# Patient Record
Sex: FEMALE | Race: WHITE | NOT HISPANIC OR LATINO | Employment: FULL TIME | URBAN - METROPOLITAN AREA
[De-identification: names, ages, dates, MRNs, and addresses within clinical notes are randomized per-mention and may not be internally consistent; named-entity substitution may affect disease eponyms.]

---

## 2017-01-10 ENCOUNTER — OFFICE VISIT (OUTPATIENT)
Dept: URGENT CARE | Facility: PHYSICIAN GROUP | Age: 52
End: 2017-01-10
Payer: COMMERCIAL

## 2017-01-10 VITALS
SYSTOLIC BLOOD PRESSURE: 142 MMHG | WEIGHT: 213.4 LBS | HEIGHT: 68 IN | HEART RATE: 96 BPM | DIASTOLIC BLOOD PRESSURE: 100 MMHG | TEMPERATURE: 99.7 F | RESPIRATION RATE: 12 BRPM | BODY MASS INDEX: 32.34 KG/M2 | OXYGEN SATURATION: 99 %

## 2017-01-10 DIAGNOSIS — R05.9 COUGH: ICD-10-CM

## 2017-01-10 DIAGNOSIS — B96.89 ACUTE BACTERIAL SINUSITIS: ICD-10-CM

## 2017-01-10 DIAGNOSIS — J01.90 ACUTE BACTERIAL SINUSITIS: ICD-10-CM

## 2017-01-10 PROCEDURE — 99214 OFFICE O/P EST MOD 30 MIN: CPT | Performed by: NURSE PRACTITIONER

## 2017-01-10 RX ORDER — LEVOFLOXACIN 500 MG/1
500 TABLET, FILM COATED ORAL DAILY
Qty: 10 TAB | Refills: 0 | Status: SHIPPED | OUTPATIENT
Start: 2017-01-10 | End: 2017-03-20

## 2017-01-10 ASSESSMENT — ENCOUNTER SYMPTOMS
SORE THROAT: 0
FEVER: 1
COUGH: 1
HEADACHES: 1
CHILLS: 1
RHINORRHEA: 1

## 2017-01-10 NOTE — MR AVS SNAPSHOT
"        Nhi Winters   1/10/2017 8:45 AM   Office Visit   MRN: [id number]    Department:  [facility]   Dept Phone:  [phone]    Description:  Female : 1965   Provider:  ALEX Stanton           Reason for Visit     Cough fever, mucous, since 16 - UC visit 16 - OTC not working      Allergies as of 1/10/2017     No Known Allergies      You were diagnosed with     Acute bacterial sinusitis   [331045]       Cough   [786.2.ICD-9-CM]         Vital Signs     Blood Pressure Pulse Temperature Respirations Height Weight    142/100 mmHg 96 37.6 °C (99.7 °F) 12 1.727 m (5' 8\") 96.798 kg (213 lb 6.4 oz)    Body Mass Index Oxygen Saturation Smoking Status             32.46 kg/m2 99% Former Smoker         Basic Information     Date Of Birth Sex Race Ethnicity Preferred Language    1965 Female White Non- English      Problem List              ICD-10-CM Priority Class Noted - Resolved    Foot pain M79.673   2011 - Present    History of depression Z86.59   2011 - Present    Bunion M21.619   2011 - Present    Metatarsalgia M77.40   2011 - Present    Hyperlipidemia E78.5   10/10/2011 - Present    Vitamin D deficiency E55.9   10/10/2011 - Present    History of reduction mammoplasty Z98.890   10/16/2012 - Present    Obesity (BMI 30.0-34.9) E66.9   2016 - Present      Health Maintenance        Date Due Completion Dates    COLONOSCOPY 2013 ---    PAP SMEAR 10/16/2015 10/16/2012    IMM INFLUENZA (1) 2016 10/26/2015, 11/15/2013    MAMMOGRAM 2016, 2013    IMM DTaP/Tdap/Td Vaccine (2 - Td) 10/1/2022 10/1/2012            Current Immunizations     Influenza TIV (IM) 11/15/2013    Influenza Vaccine Quad Inj (Preserved) 10/26/2015    Tdap Vaccine 10/1/2012    Tuberculin Skin Test 2011      Below and/or attached are the medications your provider expects you to take. Review all of your home medications and newly ordered medications " with your provider and/or pharmacist. Follow medication instructions as directed by your provider and/or pharmacist. Please keep your medication list with you and share with your provider. Update the information when medications are discontinued, doses are changed, or new medications (including over-the-counter products) are added; and carry medication information at all times in the event of emergency situations     Allergies:  No Known Allergies          Medications  Valid as of: January 10, 2017 -  9:40 AM    Generic Name Brand Name Tablet Size Instructions for use    Acetaminophen (Tab) TYLENOL 325 MG Take 650 mg by mouth every four hours as needed.        Albuterol Sulfate (Aero Soln) albuterol 108 (90 BASE) MCG/ACT Inhale 2 Puffs by mouth every four hours as needed for Shortness of Breath.        Albuterol Sulfate (Aero Soln) albuterol 108 (90 BASE) MCG/ACT Inhale 2 Puffs by mouth every four hours as needed for Shortness of Breath.        Ibuprofen   Take  by mouth.        LevoFLOXacin (Tab) LEVAQUIN 500 MG Take 1 Tab by mouth every day.        .                 Medicines prescribed today were sent to:     [facility] [address] [place] [address] [place] [address] [facility]    [facility] [address] [place] [address]    Phone: [phone] Fax: [phone]    Open 24 Hours?: No      Medication refill instructions:       If your prescription bottle indicates you have medication refills left, it is not necessary to call your provider’s office. Please contact your pharmacy and they will refill your medication.    If your prescription bottle indicates you do not have any refills left, you may request refills at any time through one of the following ways: The online [employer]ed [employer]ards system (except Urgent Care), by calling your provider’s office, or by asking your pharmacy to contact your provider’s office with a refill request. Medication refills are processed only during regular business hours and may not be  available until the next business day. Your provider may request additional information or to have a follow-up visit with you prior to refilling your medication.   *Please Note: Medication refills are assigned a new Rx number when refilled electronically. Your pharmacy may indicate that no refills were authorized even though a new prescription for the same medication is available at the pharmacy. Please request the medicine by name with the pharmacy before contacting your provider for a refill.           [employer] Access Code: Activation code not generated  Current Nemedia Status: Active

## 2017-01-10 NOTE — PROGRESS NOTES
"Subjective:      Nhi Winters is a 51 y.o. female who presents with Cough            Cough  This is a new problem. The current episode started 1 to 4 weeks ago. The problem has been unchanged. The problem occurs constantly. The cough is productive of sputum. Associated symptoms include chills, a fever, headaches, nasal congestion, postnasal drip and rhinorrhea. Pertinent negatives include no sore throat.   she has been sick since December, got somewhat better and then worsened again this past week. Fevers up to 101. 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  Allergies, medications and history reviewed by me today      Review of Systems   Constitutional: Positive for fever and chills.   HENT: Positive for postnasal drip and rhinorrhea. Negative for sore throat.    Respiratory: Positive for cough.    Neurological: Positive for headaches.          Objective:     /100 mmHg  Pulse 96  Temp(Src) 37.6 °C (99.7 °F)  Resp 12  Ht 1.727 m (5' 8\")  Wt 96.798 kg (213 lb 6.4 oz)  BMI 32.46 kg/m2  SpO2 99%     Physical Exam   Constitutional: She is oriented to person, place, and time. She appears well-developed and well-nourished. No distress.   HENT:   Head: Normocephalic and atraumatic.   Right Ear: External ear and ear canal normal. Tympanic membrane is not injected and not perforated. No middle ear effusion.   Left Ear: External ear and ear canal normal. Tympanic membrane is not injected and not perforated.  No middle ear effusion.   Nose: Mucosal edema present.   Mouth/Throat: Posterior oropharyngeal erythema present. No oropharyngeal exudate.   Eyes: Conjunctivae are normal. Right eye exhibits no discharge. Left eye exhibits no discharge.   Neck: Normal range of motion. Neck supple.   Cardiovascular: Normal rate, regular rhythm and normal heart sounds.    No murmur heard.  Pulmonary/Chest: Effort normal and breath sounds normal. No respiratory distress.   Musculoskeletal: Normal range of motion.   Normal movement of all " 4 extremities.   Lymphadenopathy:     She has no cervical adenopathy.        Right: No supraclavicular adenopathy present.        Left: No supraclavicular adenopathy present.   Neurological: She is alert and oriented to person, place, and time. Gait normal.   Skin: Skin is warm and dry.   Psychiatric: She has a normal mood and affect. Her behavior is normal. Thought content normal.   Nursing note and vitals reviewed.              Assessment/Plan:     1. Acute bacterial sinusitis  levofloxacin (LEVAQUIN) 500 MG tablet   2. Cough       Viral component possible with new onset of symptoms again after some improvement.  levaquin  May continue OTC medications as directed.  Differential diagnosis, natural history, supportive care, and indications for immediate follow-up discussed at length.

## 2017-03-20 ENCOUNTER — OFFICE VISIT (OUTPATIENT)
Dept: MEDICAL GROUP | Facility: PHYSICIAN GROUP | Age: 52
End: 2017-03-20
Payer: COMMERCIAL

## 2017-03-20 VITALS
WEIGHT: 200 LBS | BODY MASS INDEX: 31.39 KG/M2 | OXYGEN SATURATION: 96 % | SYSTOLIC BLOOD PRESSURE: 142 MMHG | DIASTOLIC BLOOD PRESSURE: 88 MMHG | TEMPERATURE: 99.3 F | HEIGHT: 67 IN | HEART RATE: 90 BPM

## 2017-03-20 DIAGNOSIS — R79.89 ELEVATED TSH: ICD-10-CM

## 2017-03-20 DIAGNOSIS — Z12.31 ENCOUNTER FOR SCREENING MAMMOGRAM FOR MALIGNANT NEOPLASM OF BREAST: ICD-10-CM

## 2017-03-20 DIAGNOSIS — E66.9 OBESITY (BMI 30-39.9): ICD-10-CM

## 2017-03-20 DIAGNOSIS — Z13.6 SCREENING FOR CARDIOVASCULAR CONDITION: ICD-10-CM

## 2017-03-20 DIAGNOSIS — R23.2 HOT FLASHES: ICD-10-CM

## 2017-03-20 DIAGNOSIS — R07.0 THROAT DISCOMFORT: ICD-10-CM

## 2017-03-20 PROBLEM — M54.2 CERVICAL PAIN: Status: ACTIVE | Noted: 2017-03-20

## 2017-03-20 PROCEDURE — 99214 OFFICE O/P EST MOD 30 MIN: CPT | Performed by: FAMILY MEDICINE

## 2017-03-20 NOTE — MR AVS SNAPSHOT
"        Nhi Winters   3/20/2017 4:20 PM   Office Visit   MRN: [id number]    Department:  [facility]   Dept Phone:  [phone]    Description:  Female : 1965   Provider:  Laquita Harrington D.O.           Reason for Visit     Establish Care wants to talk about menopause       Allergies as of 3/20/2017     No Known Allergies      You were diagnosed with     Hot flashes   [060947]       Throat discomfort   [623878]       Elevated TSH   [328381]       Obesity (BMI 30-39.9)   [491172]       Screening for cardiovascular condition   [819106]       Encounter for screening mammogram for malignant neoplasm of breast   [061246]         Vital Signs     Blood Pressure Pulse Temperature Height Weight Body Mass Index    142/88 mmHg 90 37.4 °C (99.3 °F) 1.702 m (5' 7\") 90.719 kg (200 lb) 31.32 kg/m2    Oxygen Saturation Smoking Status                96% Former Smoker          Basic Information     Date Of Birth Sex Race Ethnicity Preferred Language    1965 Female White Non- English      Problem List              ICD-10-CM Priority Class Noted - Resolved    Bunion M21.619   2011 - Present    Metatarsalgia M77.40   2011 - Present    Hyperlipidemia E78.5   10/10/2011 - Present    Vitamin D deficiency E55.9   10/10/2011 - Present    Obesity (BMI 30.0-34.9) E66.9   2016 - Present    Cervical pain M54.2   3/20/2017 - Present    Elevated TSH R94.6   3/20/2017 - Present      Health Maintenance        Date Due Completion Dates    PAP SMEAR 10/16/2015 10/16/2012    IMM INFLUENZA (1) 2016 10/26/2015, 11/15/2013    MAMMOGRAM 2016, 2013    COLONOSCOPY 2017 (Originally 2013) ---    IMM DTaP/Tdap/Td Vaccine (2 - Td) 10/1/2022 10/1/2012            Current Immunizations     Influenza TIV (IM) 11/15/2013    Influenza Vaccine Quad Inj (Preserved) 10/26/2015    Tdap Vaccine 10/1/2012    Tuberculin Skin Test 2011      Below and/or attached are the medications your " provider expects you to take. Review all of your home medications and newly ordered medications with your provider and/or pharmacist. Follow medication instructions as directed by your provider and/or pharmacist. Please keep your medication list with you and share with your provider. Update the information when medications are discontinued, doses are changed, or new medications (including over-the-counter products) are added; and carry medication information at all times in the event of emergency situations     Allergies:  No Known Allergies          Medications  Valid as of: March 20, 2017 -  5:56 PM    Generic Name Brand Name Tablet Size Instructions for use    .                 Medicines prescribed today were sent to:     [id number]  [place] - [address] AT [facility]    [address] [place] [address]    Phone: [phone] Fax: [phone]    Open 24 Hours?: No      Medication refill instructions:       If your prescription bottle indicates you have medication refills left, it is not necessary to call your provider’s office. Please contact your pharmacy and they will refill your medication.    If your prescription bottle indicates you do not have any refills left, you may request refills at any time through one of the following ways: The online Instilling Values system (except Urgent Care), by calling your provider’s office, or by asking your pharmacy to contact your provider’s office with a refill request. Medication refills are processed only during regular business hours and may not be available until the next business day. Your provider may request additional information or to have a follow-up visit with you prior to refilling your medication.   *Please Note: Medication refills are assigned a new Rx number when refilled electronically. Your pharmacy may indicate that no refills were authorized even though a new prescription for the same medication is available at the  pharmacy. Please request the medicine by name with the pharmacy before contacting your provider for a refill.        Your To Do List     Future Labs/Procedures Complete By Expires    CBC WITH DIFFERENTIAL  As directed 3/20/2018    COMP METABOLIC PANEL  As directed 3/20/2018    LIPID PROFILE  As directed 3/20/2018    MAGNESIUM  As directed 3/20/2018    TSH WITH REFLEX TO FT4  As directed 3/20/2018    US-SOFT TISSUES OF HEAD - NECK  As directed 3/20/2018    VITAMIN D,25 HYDROXY  As directed 3/20/2018         [employer] Access Code: Activation code not generated  Current [email or web address] Status: Active

## 2017-03-20 NOTE — PROGRESS NOTES
Subjective:     Chief Complaint   Patient presents with   • Establish Care     wants to talk about menopause        Nhi Winters is a 51 y.o. female here today for concern over hot flashes and menopause. Pt reports 2 to 3 months of hot flashes which is improved with Mg. Patient reports  LMP 3 months prior. Patient is adopted and does not know first-degree relatives. Patient reports mild irritability. She has tried evening. His which caused worsening mood. Patient denies any abdominal pain, weight loss, possible self harm, increased vaginal bleeding, or abdominal cramping    Patient has a history of slightly elevated TSH. Patient is currently not taking any thyroid supplementation. Patient reports a discomfort in her throat occasionally which she describes as a pressure when she is lying down. Patient denies any difficulty swallowing, painful swallowing, or change in voice.      No Known Allergies  Current medicines (including changes today)  No current outpatient prescriptions on file.     No current facility-administered medications for this visit.     Social History   Substance Use Topics   • Smoking status: Former Smoker     Quit date: 10/12/2004   • Smokeless tobacco: Never Used      Comment: avoid any and all tobacco products   • Alcohol Use: 0.0 oz/week     0 Standard drinks or equivalent per week      Comment: occ     Family Status   Relation Status Death Age   • Mother     • Father       No family history on file.  She    has a past medical history of Pain; Foot pain (9/23/2011); Family history of alcoholism (9/23/2011); and History of depression (9/23/2011).        ROS   GEN: no weight loss, fevers, or chills  HEENT: no blurry vision or change in vision  Resp: no shortness of breath, no cough  CV: no racing heart, no irregular beats, no chest pain  Abd: no nausea, no vomiting, no diarrhea, no constipation, no blood in stool, no dark stools, no incontinence  MSK: no muscle aches, no joint pain, no limited  "motion  Neuro: no headaches, no dizziness, no LOC, no weakness, no numbness/tingling       Objective:     Blood pressure 142/88, pulse 90, temperature 37.4 °C (99.3 °F), height 1.702 m (5' 7\"), weight 90.719 kg (200 lb), SpO2 96 %. Body mass index is 31.32 kg/(m^2). Physical Exam:  Constitutional: Alert, no distress.  Skin: Warm, dry, good turgor, no rashes in visible areas.  Eye: Equal, round and reactive, conjunctiva clear, lids normal.  ENMT: Lips without lesions, good dentition, oropharynx non-erythematous, no exudate, moist oral mucosa  Neck: Trachea midline, no masses, enlarged right thyroid lobe, no nodules noted no cervical or supraclavicular lymphadenopathy. Full ROM  Respiratory: Unlabored respiratory effort, good air movement, lungs clear to auscultation, no wheezes, no ronchi.  Cardiovascular:RRR, +S1, S2, no murmur, no peripheral edema, pedal and radial pulses equal and intact bilat  Abdomen: Soft, non-tender, no masses, no hepatosplenomegaly.  MSK:5/5 muscle strength in upper extremities as well as lower extremity bilaterally  Psych: Alert and oriented x3, appropriate affect and mood.  Neuro: CN2-12 intact, no gross motor or sensory deficits      Assessment and Plan:   The following treatment plan was discussed    1. Hot flashes  Most consistent with menopause. We'll obtain labs to rule out other causes. Discussion with patient about course of menopause and treatment options. Patient will try black cohosh. Advised patient that increasing vaginal bleeding is not a normal finding of menopause. If patient has increasing vaginal bleeding, painful intercourse, or transient discomfort she is to come to our office immediately. Advised patient of need for Pap.   -MAGNESIUM; Future  - COMP METABOLIC PANEL; Future  - VITAMIN D,25 HYDROXY; Future  - CBC WITH DIFFERENTIAL; Future    2. Throat discomfort  New problem: Right thyroid lobe feels enlarged, therefore ultrasound ordered  - US-SOFT TISSUES OF HEAD - NECK; " Future    3. Elevated TSH  Seen on labs in September. T4 was within normal limit. We'll be checking and treat as needed  - TSH WITH REFLEX TO FT4; Future  - COMP METABOLIC PANEL; Future  - CBC WITH DIFFERENTIAL; Future    4. Obesity (BMI 30-39.9)  Pt educated on the increase of morbidity and mortality associated with excess weight including DM, Heart Disease, HTN, stroke, and sleep apnea.  Pt advised weight loss of 5% through reduced calorie, low fat diet and 150 mins of exercise a week  - Patient identified as having weight management issue.  Appropriate orders and counseling given.  - LIPID PROFILE; Future    5. Screening for cardiovascular condition  - LIPID PROFILE; Future    6. Encounter for screening mammogram for malignant neoplasm of breast  - MA-SCREEN MAMMO W/CAD-BILAT      Followup: Return in about 4 weeks (around 4/17/2017) for PAP.    Please note that this dictation was created using voice recognition software. I have made every reasonable attempt to correct obvious errors, but I expect that there are errors of grammar and possibly content that I did not discover before finalizing the note.

## 2017-03-28 ENCOUNTER — HOSPITAL ENCOUNTER (OUTPATIENT)
Dept: RADIOLOGY | Facility: MEDICAL CENTER | Age: 52
End: 2017-03-28
Attending: FAMILY MEDICINE
Payer: COMMERCIAL

## 2017-03-28 DIAGNOSIS — R07.0 THROAT DISCOMFORT: ICD-10-CM

## 2017-03-28 PROCEDURE — 76536 US EXAM OF HEAD AND NECK: CPT

## 2017-03-30 ENCOUNTER — HOSPITAL ENCOUNTER (OUTPATIENT)
Dept: LAB | Facility: MEDICAL CENTER | Age: 52
End: 2017-03-30
Attending: FAMILY MEDICINE
Payer: COMMERCIAL

## 2017-03-30 ENCOUNTER — OFFICE VISIT (OUTPATIENT)
Dept: MEDICAL GROUP | Facility: PHYSICIAN GROUP | Age: 52
End: 2017-03-30
Payer: COMMERCIAL

## 2017-03-30 VITALS
RESPIRATION RATE: 16 BRPM | SYSTOLIC BLOOD PRESSURE: 130 MMHG | HEART RATE: 93 BPM | WEIGHT: 200 LBS | OXYGEN SATURATION: 97 % | BODY MASS INDEX: 31.39 KG/M2 | HEIGHT: 67 IN | TEMPERATURE: 98.6 F | DIASTOLIC BLOOD PRESSURE: 80 MMHG

## 2017-03-30 DIAGNOSIS — E66.9 OBESITY (BMI 30-39.9): ICD-10-CM

## 2017-03-30 DIAGNOSIS — R23.2 HOT FLASHES: ICD-10-CM

## 2017-03-30 DIAGNOSIS — R79.89 ELEVATED TSH: ICD-10-CM

## 2017-03-30 DIAGNOSIS — E04.1 THYROID NODULE: ICD-10-CM

## 2017-03-30 DIAGNOSIS — Z13.6 SCREENING FOR CARDIOVASCULAR CONDITION: ICD-10-CM

## 2017-03-30 LAB
25(OH)D3 SERPL-MCNC: 30 NG/ML (ref 30–100)
ALBUMIN SERPL BCP-MCNC: 4.3 G/DL (ref 3.2–4.9)
ALBUMIN/GLOB SERPL: 1.2 G/DL
ALP SERPL-CCNC: 75 U/L (ref 30–99)
ALT SERPL-CCNC: 20 U/L (ref 2–50)
ANION GAP SERPL CALC-SCNC: 10 MMOL/L (ref 0–11.9)
AST SERPL-CCNC: 18 U/L (ref 12–45)
BASOPHILS # BLD AUTO: 0.13 K/UL (ref 0–0.12)
BASOPHILS NFR BLD AUTO: 0.5 % (ref 0–1.8)
BILIRUB SERPL-MCNC: 0.8 MG/DL (ref 0.1–1.5)
BUN SERPL-MCNC: 21 MG/DL (ref 8–22)
CALCIUM SERPL-MCNC: 10.1 MG/DL (ref 8.5–10.5)
CHLORIDE SERPL-SCNC: 104 MMOL/L (ref 96–112)
CHOLEST SERPL-MCNC: 264 MG/DL (ref 100–199)
CO2 SERPL-SCNC: 25 MMOL/L (ref 20–33)
CREAT SERPL-MCNC: 0.91 MG/DL (ref 0.5–1.4)
EOSINOPHIL # BLD: 0.2 K/UL (ref 0–0.51)
EOSINOPHIL NFR BLD AUTO: 0.8 % (ref 0–6.9)
ERYTHROCYTE [DISTWIDTH] IN BLOOD BY AUTOMATED COUNT: 44.8 FL (ref 35.9–50)
GLOBULIN SER CALC-MCNC: 3.5 G/DL (ref 1.9–3.5)
GLUCOSE SERPL-MCNC: 108 MG/DL (ref 65–99)
HCT VFR BLD AUTO: 42.6 % (ref 37–47)
HDLC SERPL-MCNC: 77 MG/DL
HGB BLD-MCNC: 13.7 G/DL (ref 12–16)
IMM GRANULOCYTES # BLD AUTO: 0.13 K/UL (ref 0–0.11)
IMM GRANULOCYTES NFR BLD AUTO: 0.5 % (ref 0–0.9)
LDLC SERPL CALC-MCNC: 167 MG/DL
LYMPHOCYTES # BLD: 2.47 K/UL (ref 1–4.8)
LYMPHOCYTES NFR BLD AUTO: 9.5 % (ref 22–41)
MAGNESIUM SERPL-MCNC: 2.3 MG/DL (ref 1.5–2.5)
MCH RBC QN AUTO: 30.9 PG (ref 27–33)
MCHC RBC AUTO-ENTMCNC: 32.2 G/DL (ref 33.6–35)
MCV RBC AUTO: 95.9 FL (ref 81.4–97.8)
MONOCYTES # BLD: 1.06 K/UL (ref 0–0.85)
MONOCYTES NFR BLD AUTO: 4.1 % (ref 0–13.4)
NEUTROPHILS # BLD: 21.97 K/UL (ref 2–7.15)
NEUTROPHILS NFR BLD AUTO: 84.6 % (ref 44–72)
NRBC # BLD AUTO: 0 K/UL
NRBC BLD-RTO: 0 /100 WBC
PLATELET # BLD AUTO: 413 K/UL (ref 164–446)
PMV BLD AUTO: 10.5 FL (ref 9–12.9)
POTASSIUM SERPL-SCNC: 4.3 MMOL/L (ref 3.6–5.5)
PROT SERPL-MCNC: 7.8 G/DL (ref 6–8.2)
RBC # BLD AUTO: 4.44 M/UL (ref 4.2–5.4)
SODIUM SERPL-SCNC: 139 MMOL/L (ref 135–145)
TRIGL SERPL-MCNC: 100 MG/DL (ref 0–149)
TSH SERPL DL<=0.005 MIU/L-ACNC: 2.25 UIU/ML (ref 0.3–3.7)
WBC # BLD AUTO: 26 K/UL (ref 4.8–10.8)

## 2017-03-30 PROCEDURE — 36415 COLL VENOUS BLD VENIPUNCTURE: CPT

## 2017-03-30 PROCEDURE — 99213 OFFICE O/P EST LOW 20 MIN: CPT | Performed by: FAMILY MEDICINE

## 2017-03-30 PROCEDURE — 80061 LIPID PANEL: CPT

## 2017-03-30 PROCEDURE — 84443 ASSAY THYROID STIM HORMONE: CPT

## 2017-03-30 PROCEDURE — 85025 COMPLETE CBC W/AUTO DIFF WBC: CPT

## 2017-03-30 PROCEDURE — 82306 VITAMIN D 25 HYDROXY: CPT

## 2017-03-30 PROCEDURE — 83735 ASSAY OF MAGNESIUM: CPT

## 2017-03-30 PROCEDURE — 80053 COMPREHEN METABOLIC PANEL: CPT

## 2017-03-30 RX ORDER — FLUTICASONE PROPIONATE 50 MCG
1 SPRAY, SUSPENSION (ML) NASAL DAILY
COMMUNITY
End: 2019-03-12

## 2017-03-30 RX ORDER — IBUPROFEN 400 MG/1
400 TABLET ORAL EVERY 6 HOURS PRN
COMMUNITY

## 2017-03-30 ASSESSMENT — PATIENT HEALTH QUESTIONNAIRE - PHQ9: CLINICAL INTERPRETATION OF PHQ2 SCORE: 0

## 2017-03-30 NOTE — PROGRESS NOTES
Subjective:     Chief Complaint   Patient presents with   • Follow-Up       Nhi Winters is a 51 y.o. female here today for follow-up on thyroid nodule. Patient recently had ultrasound of thyroid which showed nodule. Patient reports history of exposure to multiple x-rays when she worked as a . She does report occasional pressure in her throat particularly when she is lying flat. Patient had labs done this morning. Once labs have been reviewed we will decide between nuclear medicine skin test versus FNA. Both procedures were discussed with the patient.     No Known Allergies  Current medicines (including changes today)  Current Outpatient Prescriptions   Medication Sig Dispense Refill   • ibuprofen (MOTRIN) 400 MG Tab Take 400 mg by mouth every 6 hours as needed.     • fluticasone (FLONASE) 50 MCG/ACT nasal spray Spray 1 Spray in nose every day.       No current facility-administered medications for this visit.     Social History   Substance Use Topics   • Smoking status: Former Smoker     Quit date: 10/12/2004   • Smokeless tobacco: Never Used      Comment: avoid any and all tobacco products   • Alcohol Use: 0.0 oz/week     0 Standard drinks or equivalent per week      Comment: occ     Family Status   Relation Status Death Age   • Mother     • Father       History reviewed. No pertinent family history.  She    has a past medical history of Pain; Foot pain (9/23/2011); Family history of alcoholism (9/23/2011); and History of depression (9/23/2011).        ROS   GEN: no weight loss, fevers, or chills, + hot flashes  HEENT: no blurry vision or change in vision  Resp: no shortness of breath, no cough  CV: no racing heart, no irregular beats, no chest pain  Abd: no nausea, no vomiting, no diarrhea, no constipation, no blood in stool, no dark stools, no incontinence  MSK: no muscle aches, no joint pain, no limited motion  Neuro: no headaches, no dizziness, no LOC, no weakness, no numbness/tingling        "Objective:     Blood pressure 130/80, pulse 93, temperature 37 °C (98.6 °F), resp. rate 16, height 1.702 m (5' 7\"), weight 90.719 kg (200 lb), SpO2 97 %. Body mass index is 31.32 kg/(m^2).   Physical Exam:  Constitutional: Alert, no distress.  Skin: Warm, dry, good turgor, no rashes in visible areas.  Eye: Equal, round and reactive, conjunctiva clear, lids normal.  ENMT: Lips without lesions, good dentition, oropharynx non-erythematous, no exudate, moist oral mucosa  Neck: Trachea midline, no masses, enlarged right thyroid lobe, no cervical or supraclavicular lymphadenopathy. Full ROM  Respiratory: Unlabored respiratory effort, good air movement, lungs clear to auscultation, no wheezes, no ronchi.  Cardiovascular:RRR, +S1, S2, no murmur, no peripheral edema, pedal and radial pulses equal and intact bilat  Abdomen: Soft, non-tender, no masses, no hepatosplenomegaly.  MSK:5/5 muscle strength in upper extremities as well as lower extremity bilaterally  Psych: Alert and oriented x3, appropriate affect and mood.  Neuro: CN2-12 intact, no gross motor or sensory deficits      Assessment and Plan:   The following treatment plan was discussed    1. Thyroid nodule  As per history of present illness biopsy versus thyroid nuclear medicine scan discussed with patient. Patient has had labs done today no results yet available. Once labs have been obtained we'll decide next step in management.      Followup: Return if symptoms worsen or fail to improve.    Please note that this dictation was created using voice recognition software. I have made every reasonable attempt to correct obvious errors, but I expect that there are errors of grammar and possibly content that I did not discover before finalizing the note.           "

## 2017-03-30 NOTE — MR AVS SNAPSHOT
"        Nhi Winters   3/30/2017 10:20 AM   Office Visit   MRN: [id number]    Department:  [facility]   Dept Phone:  [phone]    Description:  Female : 1965   Provider:  Laquita Harrington D.O.           Reason for Visit     Follow-Up           Allergies as of 3/30/2017     No Known Allergies      You were diagnosed with     Thyroid nodule   [[id number]]         Vital Signs     Blood Pressure Pulse Temperature Respirations Height Weight    130/80 mmHg 93 37 °C (98.6 °F) 16 1.702 m (5' 7\") 90.719 kg (200 lb)    Body Mass Index Oxygen Saturation Smoking Status             31.32 kg/m2 97% Former Smoker         Basic Information     Date Of Birth Sex Race Ethnicity Preferred Language    1965 Female White Non- English      Your appointments     2017  8:00 AM   MA [id number] with [facility] MG 3   [facility] BREAST [facility] ([address])    [address] [place] [address]  [facility] [id number] [phone]           No deodorant, powder, perfume or lotion under the arm or breast area.              Problem List              ICD-10-CM Priority Class Noted - Resolved    Bunion M21.619   2011 - Present    Metatarsalgia M77.40   2011 - Present    Hyperlipidemia E78.5   10/10/2011 - Present    Vitamin D deficiency E55.9   10/10/2011 - Present    Obesity (BMI 30.0-34.9) E66.9   2016 - Present    Cervical pain M54.2   3/20/2017 - Present    Elevated TSH R94.6   3/20/2017 - Present    Thyroid nodule E04.1   3/30/2017 - Present      Health Maintenance        Date Due Completion Dates    PAP SMEAR 10/16/2015 10/16/2012    IMM INFLUENZA (1) 2016 10/26/2015, 11/15/2013    MAMMOGRAM 2016, 2013    COLONOSCOPY 2017 (Originally 2013) ---    IMM DTaP/Tdap/Td Vaccine (2 - Td) 10/1/2022 10/1/2012            Current Immunizations     Influenza TIV (IM) 11/15/2013    Influenza Vaccine Quad Inj (Preserved) 10/26/2015    Tdap Vaccine 10/1/2012    Tuberculin Skin " Test 9/16/2011      Below and/or attached are the medications your provider expects you to take. Review all of your home medications and newly ordered medications with your provider and/or pharmacist. Follow medication instructions as directed by your provider and/or pharmacist. Please keep your medication list with you and share with your provider. Update the information when medications are discontinued, doses are changed, or new medications (including over-the-counter products) are added; and carry medication information at all times in the event of emergency situations     Allergies:  No Known Allergies          Medications  Valid as of: March 30, 2017 - 11:36 AM    Generic Name Brand Name Tablet Size Instructions for use    Fluticasone Propionate (Suspension) FLONASE 50 MCG/ACT Spray 1 Spray in nose every day.        Ibuprofen (Tab) MOTRIN 400 MG Take 400 mg by mouth every 6 hours as needed.        .                 Medicines prescribed today were sent to:     [facility] [address] [place] [address], [place] - [address] AT [facility] [address] [place]    [address] [place] [address]    Phone: [phone] Fax: [phone]    Open 24 Hours?: No      Medication refill instructions:       If your prescription bottle indicates you have medication refills left, it is not necessary to call your provider’s office. Please contact your pharmacy and they will refill your medication.    If your prescription bottle indicates you do not have any refills left, you may request refills at any time through one of the following ways: The online [employer] system (except Urgent Care), by calling your provider’s office, or by asking your pharmacy to contact your provider’s office with a refill request. Medication refills are processed only during regular business hours and may not be available until the next business day. Your provider may request additional information or to have a follow-up visit with you prior to refilling your  medication.   *Please Note: Medication refills are assigned a new Rx number when refilled electronically. Your pharmacy may indicate that no refills were authorized even though a new prescription for the same medication is available at the pharmacy. Please request the medicine by name with the pharmacy before contacting your provider for a refill.           [employer] Access Code: Activation code not generated  Current [place] Status: Active

## 2017-04-05 DIAGNOSIS — D72.825 BANDEMIA: ICD-10-CM

## 2017-04-05 DIAGNOSIS — J01.10 ACUTE NON-RECURRENT FRONTAL SINUSITIS: ICD-10-CM

## 2017-04-05 RX ORDER — AMOXICILLIN AND CLAVULANATE POTASSIUM 875; 125 MG/1; MG/1
1 TABLET, FILM COATED ORAL 2 TIMES DAILY
Qty: 14 TAB | Refills: 0 | Status: SHIPPED | OUTPATIENT
Start: 2017-04-05 | End: 2017-04-12

## 2017-04-13 ENCOUNTER — TELEPHONE (OUTPATIENT)
Dept: MEDICAL GROUP | Facility: PHYSICIAN GROUP | Age: 52
End: 2017-04-13

## 2017-04-13 DIAGNOSIS — E04.1 THYROID NODULE: ICD-10-CM

## 2017-04-13 DIAGNOSIS — R79.89 ELEVATED TSH: ICD-10-CM

## 2017-04-13 NOTE — TELEPHONE ENCOUNTER
VOICEMAIL  1. Caller Name: Nhi Winters                      Call Back Number: [phone] (home)     2. Message: pt is inquiring as to when her thyroid bx is going to be ordered so she can schedule the appointment. States she was told it was to be ordered on 4/5/17 but there is none. Please advise.    3. Patient approves office to leave a detailed voicemail/MyChart message: yes

## 2017-04-19 ENCOUNTER — HOSPITAL ENCOUNTER (OUTPATIENT)
Dept: LAB | Facility: MEDICAL CENTER | Age: 52
End: 2017-04-19
Attending: FAMILY MEDICINE
Payer: COMMERCIAL

## 2017-04-19 DIAGNOSIS — D72.825 BANDEMIA: ICD-10-CM

## 2017-04-19 LAB
BASOPHILS # BLD AUTO: 0.6 % (ref 0–1.8)
BASOPHILS # BLD: 0.11 K/UL (ref 0–0.12)
EOSINOPHIL # BLD AUTO: 0.41 K/UL (ref 0–0.51)
EOSINOPHIL NFR BLD: 2.4 % (ref 0–6.9)
ERYTHROCYTE [DISTWIDTH] IN BLOOD BY AUTOMATED COUNT: 42.6 FL (ref 35.9–50)
HCT VFR BLD AUTO: 44.9 % (ref 37–47)
HGB BLD-MCNC: 14.2 G/DL (ref 12–16)
IMM GRANULOCYTES # BLD AUTO: 0.08 K/UL (ref 0–0.11)
IMM GRANULOCYTES NFR BLD AUTO: 0.5 % (ref 0–0.9)
LYMPHOCYTES # BLD AUTO: 3.95 K/UL (ref 1–4.8)
LYMPHOCYTES NFR BLD: 23.3 % (ref 22–41)
MCH RBC QN AUTO: 30.2 PG (ref 27–33)
MCHC RBC AUTO-ENTMCNC: 31.6 G/DL (ref 33.6–35)
MCV RBC AUTO: 95.5 FL (ref 81.4–97.8)
MONOCYTES # BLD AUTO: 1.12 K/UL (ref 0–0.85)
MONOCYTES NFR BLD AUTO: 6.6 % (ref 0–13.4)
NEUTROPHILS # BLD AUTO: 11.27 K/UL (ref 2–7.15)
NEUTROPHILS NFR BLD: 66.6 % (ref 44–72)
NRBC # BLD AUTO: 0 K/UL
NRBC BLD AUTO-RTO: 0 /100 WBC
PLATELET # BLD AUTO: 432 K/UL (ref 164–446)
PMV BLD AUTO: 10.5 FL (ref 9–12.9)
RBC # BLD AUTO: 4.7 M/UL (ref 4.2–5.4)
WBC # BLD AUTO: 16.9 K/UL (ref 4.8–10.8)

## 2017-04-19 PROCEDURE — 85025 COMPLETE CBC W/AUTO DIFF WBC: CPT

## 2017-04-19 PROCEDURE — 36415 COLL VENOUS BLD VENIPUNCTURE: CPT

## 2017-05-04 ENCOUNTER — HOSPITAL ENCOUNTER (OUTPATIENT)
Dept: RADIOLOGY | Facility: MEDICAL CENTER | Age: 52
End: 2017-05-04
Attending: FAMILY MEDICINE
Payer: COMMERCIAL

## 2017-05-04 DIAGNOSIS — E04.1 THYROID NODULE: ICD-10-CM

## 2017-05-04 DIAGNOSIS — R79.89 ELEVATED TSH: ICD-10-CM

## 2017-05-04 PROCEDURE — 10022 HCHG FINE NEEDLE ASP W/IMAGING GUIDANCE: CPT

## 2017-05-04 PROCEDURE — 76942 ECHO GUIDE FOR BIOPSY: CPT

## 2017-05-04 PROCEDURE — 88112 CYTOPATH CELL ENHANCE TECH: CPT

## 2017-05-04 NOTE — PROGRESS NOTES
US guided right thyroid nodule fine needle aspiration done by Dr. Strickland; right anterior aspect of neck access site; 1 jar of cytolyt obtained and sent to pathology lab; pt tolerated the procedure well; pt hemodynamically stable pre/intra/post procedure; all questions and concerns answered prior to being d/c; patient provided with appropriate education for procedure; pt d/c home.

## 2017-05-17 ENCOUNTER — HOSPITAL ENCOUNTER (OUTPATIENT)
Dept: RADIOLOGY | Facility: MEDICAL CENTER | Age: 52
End: 2017-05-17
Attending: FAMILY MEDICINE
Payer: COMMERCIAL

## 2017-05-17 PROCEDURE — 77063 BREAST TOMOSYNTHESIS BI: CPT

## 2017-12-04 ENCOUNTER — OFFICE VISIT (OUTPATIENT)
Dept: MEDICAL GROUP | Facility: PHYSICIAN GROUP | Age: 52
End: 2017-12-04
Payer: COMMERCIAL

## 2017-12-04 VITALS
HEIGHT: 67 IN | TEMPERATURE: 98.8 F | OXYGEN SATURATION: 96 % | BODY MASS INDEX: 32.65 KG/M2 | RESPIRATION RATE: 16 BRPM | DIASTOLIC BLOOD PRESSURE: 72 MMHG | WEIGHT: 208 LBS | SYSTOLIC BLOOD PRESSURE: 120 MMHG | HEART RATE: 103 BPM

## 2017-12-04 DIAGNOSIS — Z12.11 SCREENING FOR COLON CANCER: ICD-10-CM

## 2017-12-04 DIAGNOSIS — M25.512 LEFT ANTERIOR SHOULDER PAIN: ICD-10-CM

## 2017-12-04 DIAGNOSIS — R53.82 CHRONIC FATIGUE: ICD-10-CM

## 2017-12-04 PROBLEM — J32.4 CHRONIC PANSINUSITIS: Status: ACTIVE | Noted: 2017-12-04

## 2017-12-04 PROCEDURE — 99214 OFFICE O/P EST MOD 30 MIN: CPT | Performed by: FAMILY MEDICINE

## 2017-12-04 RX ORDER — ALBUTEROL SULFATE 90 UG/1
1 AEROSOL, METERED RESPIRATORY (INHALATION)
Refills: 1 | COMMUNITY
Start: 2017-10-31 | End: 2018-03-29

## 2017-12-04 NOTE — PROGRESS NOTES
Subjective:     Chief Complaint   Patient presents with   • Other     Paperwork       Nhi Winters is a 52 y.o. 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   Pt has no medical problems that would limit her ability to work [employer] Patient does not take any sedating medications.     Patient reports chronic fatigue for many years. She is able to [employer] daily living and work full time. Patient is able to have restorative sleep each night.    Patient reports left shoulder pain with limited range of motion. 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    No Known Allergies  Current medicines (including changes today)  Current Outpatient Prescriptions   Medication Sig Dispense Refill   • ibuprofen (MOTRIN) 400 MG Tab Take 400 mg by mouth every 6 hours as needed.     • fluticasone (FLONASE) 50 MCG/ACT nasal spray Spray 1 Spray in nose every day.     • VENTOLIN  (90 Base) MCG/ACT Aero Soln inhalation aerosol Inhale 1 Puff by mouth 1 time daily as needed.  1     No current facility-administered medications for this visit.      Social History   Substance Use Topics   • Smoking status: Former Smoker     Quit date: 10/12/2004   • Smokeless tobacco: Never Used      Comment: avoid any and all tobacco products   • Alcohol use 0.0 oz/week      Comment: occ     Family Status   Relation Status   • Mother    • Father      No family history on file.  She    has a past medical history of Family history of alcoholism (9/23/2011); Foot pain (9/23/2011); History of depression (9/23/2011); and Pain.        ROS   GEN: no weight loss, fevers, or chills  HEENT: no blurry vision or change in vision, no ear pain, no difficulty swallowing, no throat pain, no runny nose, no nasal congestion  Resp: no shortness of breath, no cough  CV: no racing heart, no irregular beats, no chest pain  Abd: no nausea, no vomiting, no diarrhea, no constipation, no blood in stool, no dark stools, no incontinence  : no  "dysuria, no hematuria, no urinary incontinence, no increased frequency  MSK: no muscle aches,L shoulder pain, no limited motion  Neuro: no headaches, no dizziness, no LOC, no weakness, no numbness/tingling       Objective:     Blood pressure 120/72, pulse (!) 103, temperature 37.1 °C (98.8 °F), resp. rate 16, height 1.702 m (5' 7\"), weight 94.3 kg (208 lb), SpO2 96 %. Body mass index is 32.58 kg/m².   Physical Exam:  Constitutional: Alert, no distress.  Skin: Warm, dry, good turgor, no rashes in visible areas.  Eye: Equal, round and reactive, conjunctiva clear, lids normal.  ENMT: Lips without lesions, oropharynx non-erythematous, no exudate, moist oral mucosa  Neck: Trachea midline, no masses, no thyromegaly. No cervical or supraclavicular lymphadenopathy. Full ROM  Respiratory: Unlabored respiratory effort, good air movement, lungs clear to auscultation, no wheezes, no ronchi.  Cardiovascular:RRR, +S1, S2, no murmur, no peripheral edema, pedal and radial pulses equal and intact bilat  Abdomen: Soft, non-tender, no masses, no hepatosplenomegaly.  MSK:5/5 muscle strength in upper extremities as well as lower extremity bilaterally, left shoulder tenderness to palpation over anterior aspect at AC joint, range of motion: Extension 20°, flexion 100°, abduction 80°, internal rotation 30°, external rotation 10°  Psych: Alert and oriented, appropriate affect and mood.  Neuro: CN2-12 intact, no gross motor or sensory deficits      Assessment and Plan:   The following treatment plan was discussed    1. Chronic fatigue  Patient reports long hours at work. She attributes to her chronic fatigue to lifestyle. We will obtain labs to rule out other causes.  - TSH WITH REFLEX TO FT4; Future  - CBC WITH DIFFERENTIAL; Future  - VITAMIN D,25 HYDROXY; Future  - VITAMIN B12; Future    2. Left anterior shoulder pain  New concern: Recommend physical therapy.  - REFERRAL TO PHYSICAL THERAPY Reason for Therapy: Eval/Treat/Report    3. " Screening for colon cancer  - REFERRAL TO GI FOR COLONOSCOPY      Followup: Return in about 6 months (around 6/4/2018) for Annual with PAP.    Please note that this dictation was created using voice recognition software. I have made every reasonable attempt to correct obvious errors, but I expect that there are errors of grammar and possibly content that I did not discover before finalizing the note.

## 2017-12-12 ENCOUNTER — HOSPITAL ENCOUNTER (OUTPATIENT)
Dept: LAB | Facility: MEDICAL CENTER | Age: 52
End: 2017-12-12
Attending: FAMILY MEDICINE
Payer: COMMERCIAL

## 2017-12-12 DIAGNOSIS — R53.82 CHRONIC FATIGUE: ICD-10-CM

## 2017-12-12 LAB
25(OH)D3 SERPL-MCNC: 18 NG/ML (ref 30–100)
BASOPHILS # BLD AUTO: 0.5 % (ref 0–1.8)
BASOPHILS # BLD: 0.07 K/UL (ref 0–0.12)
EOSINOPHIL # BLD AUTO: 0.03 K/UL (ref 0–0.51)
EOSINOPHIL NFR BLD: 0.2 % (ref 0–6.9)
ERYTHROCYTE [DISTWIDTH] IN BLOOD BY AUTOMATED COUNT: 44.2 FL (ref 35.9–50)
HCT VFR BLD AUTO: 42.6 % (ref 37–47)
HGB BLD-MCNC: 13.9 G/DL (ref 12–16)
IMM GRANULOCYTES # BLD AUTO: 0.26 K/UL (ref 0–0.11)
IMM GRANULOCYTES NFR BLD AUTO: 1.7 % (ref 0–0.9)
LYMPHOCYTES # BLD AUTO: 2.81 K/UL (ref 1–4.8)
LYMPHOCYTES NFR BLD: 18.1 % (ref 22–41)
MCH RBC QN AUTO: 31.6 PG (ref 27–33)
MCHC RBC AUTO-ENTMCNC: 32.6 G/DL (ref 33.6–35)
MCV RBC AUTO: 96.8 FL (ref 81.4–97.8)
MONOCYTES # BLD AUTO: 1.02 K/UL (ref 0–0.85)
MONOCYTES NFR BLD AUTO: 6.6 % (ref 0–13.4)
NEUTROPHILS # BLD AUTO: 11.31 K/UL (ref 2–7.15)
NEUTROPHILS NFR BLD: 72.9 % (ref 44–72)
NRBC # BLD AUTO: 0 K/UL
NRBC BLD AUTO-RTO: 0 /100 WBC
PLATELET # BLD AUTO: 346 K/UL (ref 164–446)
PMV BLD AUTO: 10.4 FL (ref 9–12.9)
RBC # BLD AUTO: 4.4 M/UL (ref 4.2–5.4)
TSH SERPL DL<=0.005 MIU/L-ACNC: 1.21 UIU/ML (ref 0.3–3.7)
VIT B12 SERPL-MCNC: 926 PG/ML (ref 211–911)
WBC # BLD AUTO: 15.5 K/UL (ref 4.8–10.8)

## 2017-12-12 PROCEDURE — 82607 VITAMIN B-12: CPT

## 2017-12-12 PROCEDURE — 82306 VITAMIN D 25 HYDROXY: CPT

## 2017-12-12 PROCEDURE — 84443 ASSAY THYROID STIM HORMONE: CPT

## 2017-12-12 PROCEDURE — 85025 COMPLETE CBC W/AUTO DIFF WBC: CPT

## 2017-12-12 PROCEDURE — 36415 COLL VENOUS BLD VENIPUNCTURE: CPT

## 2017-12-15 DIAGNOSIS — D72.9 NEUTROPHILIC LEUKOCYTOSIS: ICD-10-CM

## 2017-12-22 ENCOUNTER — OFFICE VISIT (OUTPATIENT)
Dept: HEMATOLOGY ONCOLOGY | Facility: MEDICAL CENTER | Age: 52
End: 2017-12-22
Payer: COMMERCIAL

## 2017-12-22 ENCOUNTER — HOSPITAL ENCOUNTER (OUTPATIENT)
Facility: MEDICAL CENTER | Age: 52
End: 2017-12-22
Attending: INTERNAL MEDICINE
Payer: COMMERCIAL

## 2017-12-22 ENCOUNTER — NON-PROVIDER VISIT (OUTPATIENT)
Dept: HEMATOLOGY ONCOLOGY | Facility: MEDICAL CENTER | Age: 52
End: 2017-12-22
Payer: COMMERCIAL

## 2017-12-22 ENCOUNTER — DOCUMENTATION (OUTPATIENT)
Dept: HEMATOLOGY ONCOLOGY | Facility: MEDICAL CENTER | Age: 52
End: 2017-12-22

## 2017-12-22 VITALS
WEIGHT: 204.81 LBS | HEART RATE: 94 BPM | TEMPERATURE: 98.9 F | DIASTOLIC BLOOD PRESSURE: 90 MMHG | BODY MASS INDEX: 32.15 KG/M2 | OXYGEN SATURATION: 98 % | SYSTOLIC BLOOD PRESSURE: 140 MMHG | HEIGHT: 67 IN | RESPIRATION RATE: 16 BRPM

## 2017-12-22 VITALS
HEIGHT: 67 IN | RESPIRATION RATE: 16 BRPM | DIASTOLIC BLOOD PRESSURE: 90 MMHG | SYSTOLIC BLOOD PRESSURE: 140 MMHG | BODY MASS INDEX: 32.02 KG/M2 | HEART RATE: 94 BPM | OXYGEN SATURATION: 98 % | WEIGHT: 204 LBS | TEMPERATURE: 98.9 F

## 2017-12-22 DIAGNOSIS — D72.9 NEUTROPHILIA: Primary | ICD-10-CM

## 2017-12-22 DIAGNOSIS — D72.9 NEUTROPHILIA: ICD-10-CM

## 2017-12-22 LAB
BASOPHILS # BLD AUTO: 1.7 % (ref 0–1.8)
BASOPHILS # BLD: 0.24 K/UL (ref 0–0.12)
EOSINOPHIL # BLD AUTO: 0 K/UL (ref 0–0.51)
EOSINOPHIL NFR BLD: 0 % (ref 0–6.9)
ERYTHROCYTE [DISTWIDTH] IN BLOOD BY AUTOMATED COUNT: 43 FL (ref 35.9–50)
HCT VFR BLD AUTO: 42.8 % (ref 37–47)
HGB BLD-MCNC: 14 G/DL (ref 12–16)
LYMPHOCYTES # BLD AUTO: 4.23 K/UL (ref 1–4.8)
LYMPHOCYTES NFR BLD: 30.4 % (ref 22–41)
MANUAL DIFF BLD: ABNORMAL
MCH RBC QN AUTO: 31.3 PG (ref 27–33)
MCHC RBC AUTO-ENTMCNC: 32.7 G/DL (ref 33.6–35)
MCV RBC AUTO: 95.7 FL (ref 81.4–97.8)
MONOCYTES # BLD AUTO: 0.85 K/UL (ref 0–0.85)
MONOCYTES NFR BLD AUTO: 6.1 % (ref 0–13.4)
NEUTROPHILS # BLD AUTO: 8.59 K/UL (ref 2–7.15)
NEUTROPHILS NFR BLD: 61.8 % (ref 44–72)
PLATELET # BLD AUTO: 360 K/UL (ref 164–446)
PMV BLD AUTO: 10.3 FL (ref 9–12.9)
RBC # BLD AUTO: 4.47 M/UL (ref 4.2–5.4)
WBC # BLD AUTO: 13.9 K/UL (ref 4.8–10.8)

## 2017-12-22 PROCEDURE — 85027 COMPLETE CBC AUTOMATED: CPT

## 2017-12-22 PROCEDURE — 81206 BCR/ABL1 GENE MAJOR BP: CPT

## 2017-12-22 PROCEDURE — 36415 COLL VENOUS BLD VENIPUNCTURE: CPT | Performed by: INTERNAL MEDICINE

## 2017-12-22 PROCEDURE — 85007 BL SMEAR W/DIFF WBC COUNT: CPT

## 2017-12-22 PROCEDURE — 81207 BCR/ABL1 GENE MINOR BP: CPT

## 2017-12-22 PROCEDURE — 99204 OFFICE O/P NEW MOD 45 MIN: CPT | Performed by: INTERNAL MEDICINE

## 2017-12-22 ASSESSMENT — PAIN SCALES - GENERAL
PAINLEVEL: 2=MINIMAL-SLIGHT
PAINLEVEL: 2=MINIMAL-SLIGHT

## 2017-12-22 NOTE — PROGRESS NOTES
Nhi Winters is a 52 y.o. female here for a non-provider visit for: Lab Draws  on 12/22/2017 at 11:44 AM    Procedure Performed: Venipuncture     Anatomical site: Right Antecubital Area (AC)    Equipment used: 21g    Labs drawn: CBC w/diff and Differential Manual, BCR-ABL1 Qual w/Reflex    Ordering Provider: Dr. AIXA Marks By: Yessenia Alonzo, Med Ass't  No Complications

## 2017-12-23 NOTE — PROGRESS NOTES
Consult Note: Oncology    Date of consultation: 12/22/2017     Referring provider: The patient is here by the kind referral of Laquita Dodson, *    Reason for consultation:   CC: Leukocytosis      History of presenting illness:   First seen in on 12/22/2017  All relevant medical records available in [facility] were reviewed and are summarized above.    Nhi Winters  is a 52 y.o. year old female who is referred to the clinic for leukocytosis    Leukocytosis  Location, blood  Severity, mild  Timing, constant  Modifying factors, no   Quality, neutrophilic  Duration, 1st noted in March 2017  Context, discovered on blood work done for upper respiratory tract infection  Associated factors, not associated with any B symptoms    Past Medical History:    Past Medical History:   Diagnosis Date   • Family history of alcoholism 9/23/2011   • Foot pain 9/23/2011   • History of depression 9/23/2011   • Pain     RUQ PAIN       Past surgical history:    Past Surgical History:   Procedure Laterality Date   • JOHNNA BY LAPAROSCOPY  4/8/2011    Performed by MONIK LONDONO at SURGERY [facility] ORS   • OTHER  1981    BREAST REDUCTION   • APPENDECTOMY     • KNEE ARTHROSCOPY      menisectomy   • NERVE ULNAR TRANSFER     • OTHER ABDOMINAL SURGERY      appy 91   • OTHER ORTHOPEDIC SURGERY      r elbow surgery   • OTHER ORTHOPEDIC SURGERY      r knee   • OVARIAN CYSTECTOMY         Allergies:  Patient has no known allergies.    Medications:    Current Outpatient Prescriptions   Medication Sig Dispense Refill   • VENTOLIN  (90 Base) MCG/ACT Aero Soln inhalation aerosol Inhale 1 Puff by mouth 1 time daily as needed.  1   • ibuprofen (MOTRIN) 400 MG Tab Take 400 mg by mouth every 6 hours as needed.     • fluticasone (FLONASE) 50 MCG/ACT nasal spray Spray 1 Spray in nose every day.       No current facility-administered medications for this visit.        Social History:     Social History     Social History   • Marital status:  "     Spouse name: N/A   • Number of children: N/A   • Years of education: N/A     Occupational History   • Not on file.     Social History Main Topics   • Smoking status: Former Smoker     Quit date: 10/12/2004   • Smokeless tobacco: Never Used      Comment: avoid any and all tobacco products   • Alcohol use 0.0 oz/week      Comment: occ   • Drug use: No   • Sexual activity: Yes     Birth control/ protection: Sponge     Other Topics Concern   • Not on file     Social History Narrative   • No narrative on file       Family History:     Family History   Problem Relation Age of Onset   • Adopted: Yes   • Family history unknown: Yes       Review of Systems:  Constitutional: No fever, chills, weight loss, malaise/fatigue.      All other review of systems are negative except what was mentioned above in the HPI.      Physical Exam:  Vitals:   /90   Pulse 94   Temp 37.2 °C (98.9 °F)   Resp 16   Ht 1.702 m (5' 7\")   Wt 92.9 kg (204 lb 12.9 oz)   SpO2 98%   BMI 32.08 kg/m²     General: Not in acute distress,   HEENT: No pallor, icterus. Oropharynx clear.   Neck: Supple, no palpable masses.  Lymph nodes: No palpable cervical, supraclavicular, axillary or inguinal lymphadenopathy.    CVS: regular rate and rhythm, no rubs or gallops  RESP: Clear to auscultate bilaterally, no wheezing or crackles.   ABD: Soft, non tender, non distended, positive bowel sounds, no palpable organomegaly  EXT: No edema or cyanosis  CNS: Alert and oriented x3, No focal deficits.  Skin- No rash      Labs:   Results for CHAPITO SALVADOR (MRN [id number]) as of 12/22/2017 17:04   3/31/2011 11:50 9/28/2011 08:25 10/28/2015 11:14 3/30/2017 08:35 4/19/2017 06:25 12/12/2017 09:37   WBC 7.9 6.7 7.4 26.0 (H) 16.9 (H) 15.5 (H)   RBC 3.81 (L) 4.21 4.17 (L) 4.44 4.70 4.40   Hemoglobin 12.4 13.5 13.1 13.7 14.2 13.9   Hematocrit 36.4 (L) 39.9 41.0 42.6 44.9 42.6   MCV 95.5 94.9 98.3 (H) 95.9 95.5 96.8   MCH 32.7 32.2 31.4 30.9 30.2 31.6   MCHC " 34.2 33.9 32.0 (L) 32.2 (L) 31.6 (L) 32.6 (L)   RDW 13.0 12.6 44.9 44.8 42.6 44.2   Platelet Count 253 266 338 413 432 346   MPV 8.9 9.0 10.5 10.5 10.5 10.4   Neutrophils-Polys 62.7 47.2 49.10 84.60 (H) 66.60 72.90 (H)   Neutrophils (Absolute)   3.64 21.97 (H) 11.27 (H) 11.31 (H)   Lymphocytes 29.6 41.0 36.30 9.50 (L) 23.30 18.10 (L)   Lymphs (Absolute)   2.69 2.47 3.95 2.81   Monocytes 6.5 7.8 10.50 4.10 6.60 6.60   Monos (Absolute)   0.78 1.06 (H) 1.12 (H) 1.02 (H)   Eosinophils 0.7 3.6 2.40 0.80 2.40 0.20   Eos (Absolute)   0.18 0.20 0.41 0.03   Basophils 0.5 0.4 1.30 0.50 0.60 0.50   Baso (Absolute)   0.10 0.13 (H) 0.11 0.07   Immature Granulocytes   0.40 0.50 0.50 1.70 (H)   Immature Granulocytes (abs)   0.03 0.13 (H) 0.08 0.26 (H)   Nucleated RBC   0.00 0.00 0.00 0.00   NRBC (Absolute)   0.00 0.00 0.00 0.00     Assessment and Plan:  -Leukocytosis  -Neutrophilic  -We will check a CBC with manual differential  -We will rule out CML with BCR-ABL PCR  -All labs and/or imaging studies mentioned in the note above were reviewed with and explained to the patient as a pertain to medical decision making.                She agreed and verbalized her agreement and understanding with the current plan.  I answered all questions and concerns she has at this time.    Dear  Laquita Dodson, *, Thank you very much for allowing me to see  Nhi Winters today .     Please note that this dictation was created using voice recognition software. I have made every reasonable attempt to correct obvious errors, but I expect that there are errors of grammar and possibly content that I did not discover before finalizing the note.      SIGNATURES:  Amarjit Paz    CC:  Laquita Dodson D.O.  Laquita Dodson, *

## 2017-12-27 LAB
BCR ALB SOURCE Q4284: NORMAL
T(9;22)(ABL1,BCR) BLD/T QL: NOT DETECTED

## 2018-03-29 ENCOUNTER — OFFICE VISIT (OUTPATIENT)
Dept: MEDICAL GROUP | Facility: LAB | Age: 53
End: 2018-03-29
Payer: COMMERCIAL

## 2018-03-29 VITALS
SYSTOLIC BLOOD PRESSURE: 130 MMHG | BODY MASS INDEX: 29.19 KG/M2 | DIASTOLIC BLOOD PRESSURE: 88 MMHG | TEMPERATURE: 97.9 F | WEIGHT: 186 LBS | HEART RATE: 88 BPM | HEIGHT: 67 IN | OXYGEN SATURATION: 97 % | RESPIRATION RATE: 16 BRPM

## 2018-03-29 DIAGNOSIS — E78.2 MIXED HYPERLIPIDEMIA: ICD-10-CM

## 2018-03-29 DIAGNOSIS — Z12.31 SCREENING MAMMOGRAM, ENCOUNTER FOR: ICD-10-CM

## 2018-03-29 DIAGNOSIS — Z12.11 SCREENING FOR MALIGNANT NEOPLASM OF COLON: ICD-10-CM

## 2018-03-29 DIAGNOSIS — R03.0 ELEVATED BLOOD PRESSURE READING: ICD-10-CM

## 2018-03-29 DIAGNOSIS — E55.9 VITAMIN D DEFICIENCY: ICD-10-CM

## 2018-03-29 DIAGNOSIS — R79.89 ELEVATED TSH: ICD-10-CM

## 2018-03-29 DIAGNOSIS — E04.1 THYROID NODULE: ICD-10-CM

## 2018-03-29 DIAGNOSIS — Z00.00 HEALTH MAINTENANCE EXAMINATION: ICD-10-CM

## 2018-03-29 DIAGNOSIS — J32.4 CHRONIC PANSINUSITIS: ICD-10-CM

## 2018-03-29 DIAGNOSIS — D72.9 NEUTROPHILIA: ICD-10-CM

## 2018-03-29 PROCEDURE — 99215 OFFICE O/P EST HI 40 MIN: CPT | Performed by: FAMILY MEDICINE

## 2018-03-29 ASSESSMENT — PATIENT HEALTH QUESTIONNAIRE - PHQ9: CLINICAL INTERPRETATION OF PHQ2 SCORE: 0

## 2018-03-29 NOTE — PATIENT INSTRUCTIONS
"DASH Eating Plan  DASH stands for \"Dietary Approaches to Stop Hypertension.\" The DASH eating plan is a healthy eating plan that has been shown to reduce high blood pressure (hypertension). Additional health benefits may include reducing the risk of type 2 diabetes mellitus, heart disease, and stroke. The DASH eating plan may also help with weight loss.  What do I need to know about the DASH eating plan?  For the DASH eating plan, you will follow these general guidelines:  · Choose foods with less than 150 milligrams of sodium per serving (as listed on the food label).  · Use salt-free seasonings or herbs instead of table salt or sea salt.  · Check with your health care provider or pharmacist before using salt substitutes.  · Eat lower-sodium products. These are often labeled as \"low-sodium\" or \"no salt added.\"  · Eat fresh foods. Avoid eating a lot of canned foods.  · Eat more vegetables, fruits, and low-fat dairy products.  · Choose whole grains. Look for the word \"whole\" as the first word in the ingredient list.  · Choose fish and skinless chicken or turkey more often than red meat. Limit fish, poultry, and meat to 6 oz (170 g) each day.  · Limit sweets, desserts, sugars, and sugary drinks.  · Choose heart-healthy fats.  · Eat more home-cooked food and less restaurant, buffet, and fast food.  · Limit fried foods.  · Do not west foods. Cook foods using methods such as baking, boiling, grilling, and broiling instead.  · When eating at a restaurant, ask that your food be prepared with less salt, or no salt if possible.  What foods can I eat?  Seek help from a dietitian for individual calorie needs.  Grains   Whole grain or whole wheat bread. Brown rice. Whole grain or whole wheat pasta. Quinoa, bulgur, and whole grain cereals. Low-sodium cereals. Corn or whole wheat flour tortillas. Whole grain cornbread. Whole grain crackers. Low-sodium crackers.  Vegetables   Fresh or frozen vegetables (raw, steamed, roasted, or " grilled). Low-sodium or reduced-sodium tomato and vegetable juices. Low-sodium or reduced-sodium tomato sauce and paste. Low-sodium or reduced-sodium canned vegetables.  Fruits   All fresh, canned (in natural juice), or frozen fruits.  Meat and Other Protein Products   Ground beef (85% or leaner), grass-fed beef, or beef trimmed of fat. Skinless chicken or turkey. Ground chicken or turkey. Pork trimmed of fat. All fish and seafood. Eggs. Dried beans, peas, or lentils. Unsalted nuts and seeds. Unsalted canned beans.  Dairy   Low-fat dairy products, such as skim or 1% milk, 2% or reduced-fat cheeses, low-fat ricotta or cottage cheese, or plain low-fat yogurt. Low-sodium or reduced-sodium cheeses.  Fats and Oils   Tub margarines without trans fats. Light or reduced-fat mayonnaise and salad dressings (reduced sodium). Avocado. Safflower, olive, or canola oils. Natural peanut or almond butter.  Other   Unsalted popcorn and pretzels.  The items listed above may not be a complete list of recommended foods or beverages. Contact your dietitian for more options.   What foods are not recommended?  Grains   White bread. White pasta. White rice. Refined cornbread. Bagels and croissants. Crackers that contain trans fat.  Vegetables   Creamed or fried vegetables. Vegetables in a cheese sauce. Regular canned vegetables. Regular canned tomato sauce and paste. Regular tomato and vegetable juices.  Fruits   Canned fruit in light or heavy syrup. Fruit juice.  Meat and Other Protein Products   Fatty cuts of meat. Ribs, chicken wings, stout, sausage, bologna, salami, chitterlings, fatback, hot dogs, bratwurst, and packaged luncheon meats. Salted nuts and seeds. Canned beans with salt.  Dairy   Whole or 2% milk, cream, half-and-half, and cream cheese. Whole-fat or sweetened yogurt. Full-fat cheeses or blue cheese. Nondairy creamers and whipped toppings. Processed cheese, cheese spreads, or cheese curds.  Condiments   Onion and garlic  salt, seasoned salt, table salt, and sea salt. Canned and packaged gravies. Worcestershire sauce. Tartar sauce. Barbecue sauce. Teriyaki sauce. Soy sauce, including reduced sodium. Steak sauce. Fish sauce. Oyster sauce. Cocktail sauce. Horseradish. Ketchup and mustard. Meat flavorings and tenderizers. Bouillon cubes. Hot sauce. Tabasco sauce. Marinades. Taco seasonings. Relishes.  Fats and Oils   Butter, stick margarine, lard, shortening, ghee, and stout fat. Coconut, palm kernel, or palm oils. Regular salad dressings.  Other   Pickles and olives. Salted popcorn and pretzels.  The items listed above may not be a complete list of foods and beverages to avoid. Contact your dietitian for more information.   Where can I find more information?  National Heart, Lung, and [employer] [place]: www.nhlbi.nih.gov/health/health-topics/topics/dash/  This information is not intended to replace advice given to you by your health care provider. Make sure you discuss any questions you have with your health care provider.  Document Released: 12/06/2012 Document Revised: 05/25/2017 Document Reviewed: 10/22/2014  Elsevier Interactive Patient Education © 2017 [employer]

## 2018-03-29 NOTE — PROGRESS NOTES
Chapito Salvador is a 52 y.o. female here for   Chief Complaint   Patient presents with   • Establish Care   -sinus trouble  -thyroid nodule    HPI:  Chapito is a very pleasant 52 y.o. female. She is here today to establish care. Previous PCP Dr. Harrington.     1. Chronic pansinusitis  This is a chronic problem for the patient but new to discuss with me. Patient has been followed by Dr. Lorenzo Smith for sinus congestion, postnasal drip, sinus pressure. She did have imaging of her sinuses and was told that this was nonsurgical. She has been using Flonase as needed. She is interested in possible second opinion.    2. Thyroid nodule  3. Elevated TSH  This is a new problem to discuss with me. Patient states that she has had a thyroid ultrasound which is below. She did have a biopsy given the size of the nodule. The thyroid biopsy about one year ago was benign. She feels as though this is growing and has a difficult time swallowing at times. She has had an elevated TSH once but has otherwise had normal TSH and free T4. She states that many years ago she was on thyroid supplementation was taken off of this    Thyroid biopsy pathology  A. Right thyroid lobe mass FNA:         [place] category II - small aggregates of follicular cells and          numerous macrophages, consistent with origin from a benign          adenomatous nodule.         Adequate cellularity is available for evaluation.    Thyroid ultrasound  2 cm heterogeneous mass lower pole right lobe of the thyroid. Recommend consideration of biopsy.    4. Mixed hyperlipidemia  This is chronic. Not on medication. Patient has been following a ketogenic diet now on and has lost weight.  Results for CHAPITO SALVADOR (MRN [id number]) as of 3/29/2018 14:05   Ref. Range 3/30/2017 08:35   Cholesterol,Tot Latest Ref Range: 100 - 199 mg/dL 264 (H)   Triglycerides Latest Ref Range: 0 - 149 mg/dL 100   HDL Latest Ref Range: >=40 mg/dL 77   LDL Latest Ref Range: <100 mg/dL 167 (H)     5.  Vitamin D deficiency  This is chronic. Patient has been taking over-the-counter vitamin D3 fairly regularly. Labs last checked a couple months ago  Results for CHAPITO SALVADOR (MRN [id number]) as of 3/29/2018 14:05   Ref. Range 12/12/2017 09:37   25-Hydroxy   Vitamin D 25 Latest Ref Range: 30 - 100 ng/mL 18 (L)     6. Neutrophilia  This is a new problem to discuss with me, chronic for the patient. Patient reports having a steroid injection in her shoulder prior to having her first labs drawn. In March 2017, she had a white blood cell count of 26 and absolute neutrophil count of 22. This has trended down slightly over the course of the last year. She did see hematology regarding this. The records are reviewed and are in the chart today. They recommended a repeat CBC with manual differential which was performed. They also checked BCR/ABL which was negative. His recommendation was to follow-up with PCP. She has had recurrent sinus infections. No other recurrent infections  Results for CHAPITO SALVADOR (MRN [id number]) as of 3/29/2018 14:05   Ref. Range 12/22/2017 10:34   WBC Latest Ref Range: 4.8 - 10.8 K/uL 13.9 (H)   RBC Latest Ref Range: 4.20 - 5.40 M/uL 4.47   Hemoglobin Latest Ref Range: 12.0 - 16.0 g/dL 14.0   Hematocrit Latest Ref Range: 37.0 - 47.0 % 42.8   MCV Latest Ref Range: 81.4 - 97.8 fL 95.7   MCH Latest Ref Range: 27.0 - 33.0 pg 31.3   MCHC Latest Ref Range: 33.6 - 35.0 g/dL 32.7 (L)   RDW Latest Ref Range: 35.9 - 50.0 fL 43.0   Platelet Count Latest Ref Range: 164 - 446 K/uL 360   MPV Latest Ref Range: 9.0 - 12.9 fL 10.3   Neutrophils-Polys Latest Ref Range: 44.00 - 72.00 % 61.80   Neutrophils (Absolute) Latest Ref Range: 2.00 - 7.15 K/uL 8.59 (H)   Lymphocytes Latest Ref Range: 22.00 - 41.00 % 30.40   Lymphs (Absolute) Latest Ref Range: 1.00 - 4.80 K/uL 4.23   Monocytes Latest Ref Range: 0.00 - 13.40 % 6.10   Monos (Absolute) Latest Ref Range: 0.00 - 0.85 K/uL 0.85   Eosinophils Latest Ref Range: 0.00  - 6.90 % 0.00   Eos (Absolute) Latest Ref Range: 0.00 - 0.51 K/uL 0.00   Basophils Latest Ref Range: 0.00 - 1.80 % 1.70   Baso (Absolute) Latest Ref Range: 0.00 - 0.12 K/uL 0.24 (H)   Manual Diff Status Unknown PERFORMED   BCR/ABL t (9;22) Qual-PCR Unknown Not Detected   BCR/ABL Source Unknown Blood     7. Health maintenance examination  Patient does not have an updated colonoscopy. Her last colonoscopy was when she was 38 years old. Mammogram was May 2017    8. Elevated blood pressure reading  This is a new problem. Patient states over the last year, her blood pressure started to rise. 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 She denies any headaches, chest pain, shortness of breath, vision changes, lower extremity edema, difficulty urinating.        Current medicines (including changes today)  Current Outpatient Prescriptions   Medication Sig Dispense Refill   • ibuprofen (MOTRIN) 400 MG Tab Take 400 mg by mouth every 6 hours as needed.     • fluticasone (FLONASE) 50 MCG/ACT nasal spray Spray 1 Spray in nose every day.       No current facility-administered medications for this visit.      She  has a past medical history of Elevated blood pressure reading (3/29/2018); Family history of alcoholism (9/23/2011); Foot pain (9/23/2011); History of depression (9/23/2011); Neutrophilia (3/29/2018); and Pain.  She  has a past surgical history that includes other abdominal surgery; other (1981); other orthopedic surgery; other orthopedic surgery; kimi by laparoscopy (4/8/2011); appendectomy; knee arthroscopy; ovarian cystectomy; and nerve ulnar transfer.  Social History   Substance Use Topics   • Smoking status: Former Smoker     Packs/day: 0.50     Years: 20.00     Quit date: 10/12/1994   • Smokeless tobacco: Never Used      Comment: avoid any and all tobacco products   • Alcohol use 0.0 oz/week      Comment: occ, h/o heavy ETOH use in rehab 1994     Social History  "    Social History Narrative   • No narrative on file     Family History   Problem Relation Age of Onset   • Adopted: Yes   • Family history unknown: Yes     Family Status   Relation Status   • Mother    • Father          ROS  Positive for intentional weight loss, sun spots, sinus complaints, neck stiffness, difficulty swallowing, nocturia  All other systems reviewed and are negative     Objective:     Blood pressure 130/88, pulse 88, temperature 36.6 °C (97.9 °F), resp. rate 16, height 1.702 m (5' 7\"), weight 84.4 kg (186 lb), SpO2 97 %. Body mass index is 29.13 kg/m².  Physical Exam:    Constitutional: Alert, no distress.  Skin: Warm, dry, good turgor, no rashes in visible areas.  Eye: Equal, round and reactive, conjunctiva clear, lids normal.  ENMT: Lips without lesions, good dentition, oropharynx clear. TM's pearly gray with normal light reflexes bilaterally. Edematous nasal turbinates  Neck: Trachea midline, there is a palpable mass on the right side adjacent to the thyroid. No cervical or supraclavicular lymphadenopathy.  Respiratory: Unlabored respiratory effort, lungs clear to auscultation bilaterally, no wheezes, no ronchi.  Cardiovascular: Normal S1, S2, RRR, no murmur, no edema.  Abdomen: Soft, non-tender, no masses, no hepatosplenomegaly.  Psych: Alert and oriented x3, normal affect and mood.      Assessment and Plan:   The following treatment plan was discussed    1. Chronic pansinusitis  This is chronic and currently unstable. She had like a second opinion. I think that this is appropriate. ENT referral placed. Encouraged use of intranasal steroids twice a day  - COMP METABOLIC PANEL; Future  - CBC WITH DIFFERENTIAL; Future  - REFERRAL TO ENT    2. Thyroid nodule  Chronic for the patient, new to discuss with me. Thyroid ultrasound and biopsy reviewed as above today. We will repeat thyroid ultrasound to see if this is growing. She may need surgical evaluation.  - US-SOFT TISSUES OF HEAD - NECK; " Future  - TSH; Future  - FREE THYROXINE; Future  - REFERRAL TO ENT    3. Elevated TSH  Occurred once over one year ago. We will recheck labs  - TSH; Future  - FREE THYROXINE; Future    4. Mixed hyperlipidemia  This is chronic, uncontrolled. Patient is now on a ketogenic diet which may increase her LDL, although she has lost weight. Recheck labs  - LIPID PROFILE; Future  - COMP METABOLIC PANEL; Future    5. Vitamin D deficiency  Chronic, recheck labs. Supplementing with over-the-counter vitamin D  - VITAMIN D,25 HYDROXY; Future    6. Neutrophilia  This is a new problem to discuss with me. She has seen hematology as above. We will recheck labs. CML unlikely given negative labs  - CBC WITH DIFFERENTIAL; Future    7. Health maintenance examination  Labs ordered, colonoscopy ordered, mammogram ordered  - LIPID PROFILE; Future  - COMP METABOLIC PANEL; Future  - TSH; Future  - FREE THYROXINE; Future  - VITAMIN D,25 HYDROXY; Future  - CBC WITH DIFFERENTIAL; Future    8. Screening for malignant neoplasm of colon  - REFERRAL TO GI FOR COLONOSCOPY    9. Elevated blood pressure reading  This is a new problem. Patient will continue home blood pressure monitoring. Weight loss-I recommended. Recheck in 3 months    10. Screening mammogram, encounter for  - MA-SCREEN MAMMO W/CAD-BILAT; Future      Records requested.  Followup: Return for Annual then for pap smear .         This note was created using voice recognition software. I have made every reasonable attempt to correct errors, however, I do anticipate some grammatical errors.     Total 40 minutes face-to-face time spent with patient not including any procedure, with greater than 50% of the total time discussing patient's issues and symptoms as listed above in assessment and plan, as well as managing coordination of care for future evaluation and treatment.

## 2018-04-02 ENCOUNTER — HOSPITAL ENCOUNTER (OUTPATIENT)
Dept: LAB | Facility: MEDICAL CENTER | Age: 53
End: 2018-04-02
Attending: FAMILY MEDICINE
Payer: COMMERCIAL

## 2018-04-02 DIAGNOSIS — R79.89 ELEVATED TSH: ICD-10-CM

## 2018-04-02 DIAGNOSIS — E78.2 MIXED HYPERLIPIDEMIA: ICD-10-CM

## 2018-04-02 DIAGNOSIS — J32.4 CHRONIC PANSINUSITIS: ICD-10-CM

## 2018-04-02 DIAGNOSIS — E55.9 VITAMIN D DEFICIENCY: ICD-10-CM

## 2018-04-02 DIAGNOSIS — Z00.00 HEALTH MAINTENANCE EXAMINATION: ICD-10-CM

## 2018-04-02 DIAGNOSIS — D72.9 NEUTROPHILIA: ICD-10-CM

## 2018-04-02 DIAGNOSIS — E04.1 THYROID NODULE: ICD-10-CM

## 2018-04-02 LAB
25(OH)D3 SERPL-MCNC: 49 NG/ML (ref 30–100)
ALBUMIN SERPL BCP-MCNC: 4.1 G/DL (ref 3.2–4.9)
ALBUMIN/GLOB SERPL: 1.2 G/DL
ALP SERPL-CCNC: 57 U/L (ref 30–99)
ALT SERPL-CCNC: 28 U/L (ref 2–50)
ANION GAP SERPL CALC-SCNC: 9 MMOL/L (ref 0–11.9)
AST SERPL-CCNC: 29 U/L (ref 12–45)
BASOPHILS # BLD AUTO: 0.9 % (ref 0–1.8)
BASOPHILS # BLD: 0.07 K/UL (ref 0–0.12)
BILIRUB SERPL-MCNC: 0.7 MG/DL (ref 0.1–1.5)
BUN SERPL-MCNC: 22 MG/DL (ref 8–22)
CALCIUM SERPL-MCNC: 9.6 MG/DL (ref 8.5–10.5)
CHLORIDE SERPL-SCNC: 104 MMOL/L (ref 96–112)
CHOLEST SERPL-MCNC: 243 MG/DL (ref 100–199)
CO2 SERPL-SCNC: 25 MMOL/L (ref 20–33)
CREAT SERPL-MCNC: 0.74 MG/DL (ref 0.5–1.4)
EOSINOPHIL # BLD AUTO: 0.24 K/UL (ref 0–0.51)
EOSINOPHIL NFR BLD: 3.2 % (ref 0–6.9)
ERYTHROCYTE [DISTWIDTH] IN BLOOD BY AUTOMATED COUNT: 51.3 FL (ref 35.9–50)
GLOBULIN SER CALC-MCNC: 3.5 G/DL (ref 1.9–3.5)
GLUCOSE SERPL-MCNC: 82 MG/DL (ref 65–99)
HCT VFR BLD AUTO: 41.7 % (ref 37–47)
HDLC SERPL-MCNC: 72 MG/DL
HGB BLD-MCNC: 13.6 G/DL (ref 12–16)
IMM GRANULOCYTES # BLD AUTO: 0.02 K/UL (ref 0–0.11)
IMM GRANULOCYTES NFR BLD AUTO: 0.3 % (ref 0–0.9)
LDLC SERPL CALC-MCNC: 158 MG/DL
LYMPHOCYTES # BLD AUTO: 1.94 K/UL (ref 1–4.8)
LYMPHOCYTES NFR BLD: 25.6 % (ref 22–41)
MCH RBC QN AUTO: 32.8 PG (ref 27–33)
MCHC RBC AUTO-ENTMCNC: 32.6 G/DL (ref 33.6–35)
MCV RBC AUTO: 100.5 FL (ref 81.4–97.8)
MONOCYTES # BLD AUTO: 0.7 K/UL (ref 0–0.85)
MONOCYTES NFR BLD AUTO: 9.2 % (ref 0–13.4)
NEUTROPHILS # BLD AUTO: 4.62 K/UL (ref 2–7.15)
NEUTROPHILS NFR BLD: 60.8 % (ref 44–72)
NRBC # BLD AUTO: 0 K/UL
NRBC BLD-RTO: 0 /100 WBC
PLATELET # BLD AUTO: 340 K/UL (ref 164–446)
PMV BLD AUTO: 11 FL (ref 9–12.9)
POTASSIUM SERPL-SCNC: 4.1 MMOL/L (ref 3.6–5.5)
PROT SERPL-MCNC: 7.6 G/DL (ref 6–8.2)
RBC # BLD AUTO: 4.15 M/UL (ref 4.2–5.4)
SODIUM SERPL-SCNC: 138 MMOL/L (ref 135–145)
T4 FREE SERPL-MCNC: 0.83 NG/DL (ref 0.53–1.43)
TRIGL SERPL-MCNC: 66 MG/DL (ref 0–149)
TSH SERPL DL<=0.005 MIU/L-ACNC: 2.88 UIU/ML (ref 0.38–5.33)
WBC # BLD AUTO: 7.6 K/UL (ref 4.8–10.8)

## 2018-04-02 PROCEDURE — 80053 COMPREHEN METABOLIC PANEL: CPT

## 2018-04-02 PROCEDURE — 84443 ASSAY THYROID STIM HORMONE: CPT

## 2018-04-02 PROCEDURE — 36415 COLL VENOUS BLD VENIPUNCTURE: CPT

## 2018-04-02 PROCEDURE — 85025 COMPLETE CBC W/AUTO DIFF WBC: CPT

## 2018-04-02 PROCEDURE — 80061 LIPID PANEL: CPT

## 2018-04-02 PROCEDURE — 84439 ASSAY OF FREE THYROXINE: CPT

## 2018-04-02 PROCEDURE — 82306 VITAMIN D 25 HYDROXY: CPT

## 2018-05-23 ENCOUNTER — HOSPITAL ENCOUNTER (OUTPATIENT)
Dept: RADIOLOGY | Facility: MEDICAL CENTER | Age: 53
End: 2018-05-23
Attending: FAMILY MEDICINE
Payer: COMMERCIAL

## 2018-05-23 DIAGNOSIS — Z12.31 SCREENING MAMMOGRAM, ENCOUNTER FOR: ICD-10-CM

## 2018-05-23 DIAGNOSIS — E04.1 THYROID NODULE: ICD-10-CM

## 2018-05-23 PROCEDURE — 76536 US EXAM OF HEAD AND NECK: CPT

## 2018-05-23 PROCEDURE — 77067 SCR MAMMO BI INCL CAD: CPT

## 2018-06-22 ENCOUNTER — APPOINTMENT (OUTPATIENT)
Dept: RADIOLOGY | Facility: IMAGING CENTER | Age: 53
End: 2018-06-22
Attending: PHYSICIAN ASSISTANT
Payer: COMMERCIAL

## 2018-06-22 ENCOUNTER — OCCUPATIONAL MEDICINE (OUTPATIENT)
Dept: URGENT CARE | Facility: CLINIC | Age: 53
End: 2018-06-22
Payer: COMMERCIAL

## 2018-06-22 VITALS
OXYGEN SATURATION: 98 % | HEART RATE: 92 BPM | RESPIRATION RATE: 16 BRPM | HEIGHT: 67 IN | TEMPERATURE: 98.3 F | BODY MASS INDEX: 29.19 KG/M2 | SYSTOLIC BLOOD PRESSURE: 130 MMHG | WEIGHT: 186 LBS | DIASTOLIC BLOOD PRESSURE: 90 MMHG

## 2018-06-22 DIAGNOSIS — S39.012A LUMBOSACRAL STRAIN, INITIAL ENCOUNTER: ICD-10-CM

## 2018-06-22 DIAGNOSIS — Z02.1 PRE-EMPLOYMENT DRUG SCREENING: ICD-10-CM
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
## 2018-06-22 DIAGNOSIS — S39.012A LUMBOSACRAL STRAIN, INITIAL ENCOUNTER: Primary | ICD-10-CM

## 2018-06-22 LAB
AMP AMPHETAMINE: NORMAL
BREATH ALCOHOL COMMENT: NORMAL
COC COCAINE: NORMAL
INT CON NEG: NEGATIVE
INT CON POS: POSITIVE
MET METHAMPHETAMINES: NORMAL
OPI OPIATES: NORMAL
PCP PHENCYCLIDINE: NORMAL
POC BREATHALIZER: 0 PERCENT (ref 0–0.01)
POC DRUG COMMENT 753798-OCCUPATIONAL HEALTH: NEGATIVE
THC: NORMAL

## 2018-06-22 PROCEDURE — 99213 OFFICE O/P EST LOW 20 MIN: CPT | Mod: 29 | Performed by: PHYSICIAN ASSISTANT

## 2018-06-22 PROCEDURE — 72100 X-RAY EXAM L-S SPINE 2/3 VWS: CPT | Mod: TC | Performed by: PHYSICIAN ASSISTANT

## 2018-06-22 PROCEDURE — 80305 DRUG TEST PRSMV DIR OPT OBS: CPT | Mod: 29 | Performed by: PHYSICIAN ASSISTANT

## 2018-06-22 PROCEDURE — 82075 ASSAY OF BREATH ETHANOL: CPT | Performed by: PHYSICIAN ASSISTANT

## 2018-06-22 RX ORDER — METHYLPREDNISOLONE 4 MG/1
TABLET ORAL
Qty: 21 TAB | Refills: 0 | Status: SHIPPED | OUTPATIENT
Start: 2018-06-22 | End: 2018-07-31

## 2018-06-22 RX ORDER — CYCLOBENZAPRINE HCL 5 MG
5-10 TABLET ORAL 3 TIMES DAILY PRN
Qty: 30 TAB | Refills: 0 | Status: SHIPPED | OUTPATIENT
Start: 2018-06-22 | End: 2019-03-12

## 2018-06-22 NOTE — LETTER
[address] [place] [address]  Phone:  [phone] - Fax:  [phone]   [facility] Progress Report and Disability Certification  Date of Service: 6/22/2018   No Show:  No  Date / Time of Next Visit: 6/26/2018 @ 8:30 AM   Claim Information   Patient Name: Nhi Winters  Claim Number:     Employer: [employer]  Date of Injury: 6/19/2018     Insurer / TPA:   ALISTAIR ID / SSN:     Occupation: Paramedic  Diagnosis: The primary encounter diagnosis was Lumbosacral strain, initial encounter. A diagnosis of Work related injury was also pertinent to this visit.    Medical Information   Related to Industrial Injury? 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 Pt notes swelling, tightness muscle spasms and pain. Pt denies loss of bowel or bladder control. Pt has not taken any Rx medications for this condition. Pt states the pain is a 7/10, aching in nature and worse at night. Pt denies CP, SOB, NVD, paresthesias, headaches, dizziness, change in vision, hives, or other joint pain. The pt's medication list, problem list, and allergies have been evaluated and reviewed during today's visit.       Objective Findings: Lumbar spine: Upon inspection, no ecchymoses, no edema, no erythema. +TTP about paraspinal muscles, BLE: +AROM, +SILT, STR 5/5, DP 2+ B/L      Pre-Existing Condition(s):     Assessment:   Initial Visit    Status: Additional Care Required  Permanent Disability:     Plan: MedicationMedication (NOT at Work)  Comments:OTC ibuprofen for pain and RX medrol and RX Flexeril (not at work)    Diagnostics:      Comments:       Disability Information   Status:      From:  6/22/2018  Through: 6/26/2018 Restrictions are: Temporary   Physical Restrictions   Sitting:    Standing:     Stoopin hrs/day Bending:  < or = to 2 hrs/day   Squattin hrs/day Walking:    Climbin hrs/day Pushin hrs/day   Pullin hrs/day Other:    Reaching Above Shoulder (L):   Reaching Above Shoulder (R):       Reaching Below Shoulder (L):    Reaching Below Shoulder (R):      Not to exceed Weight Limits   Carrying(hrs):   Weight Limit(lb): < or = to 10 pounds Lifting(hrs):   Weight  Limit(lb): < or = to 10 pounds   Comments:      Repetitive Actions   Hands: i.e. Fine Manipulations from Grasping:     Feet: i.e. Operating Foot Controls:     Driving / Operate Machinery:     Physician Name: Amy Mcgowan P.A.-C. Physician Signature: AMY Astorga P.A.-C. e-Signature: Dr. Rajiv Hunter, Medical Director   Clinic Name / Location: [address] [address] Clinic Phone Number: Dept: [phone]   Appointment Time: 11:00 Am Visit Start Time: 12:15 PM   Check-In Time:  11:11 Am Visit Discharge Time:  1:05 PM   Original-Treating Physician or Chiropractor    Page 2-Insurer/TPA    Page 3-Employer    Page 4-Employee

## 2018-06-22 NOTE — LETTER
"EMPLOYEE’S CLAIM FOR COMPENSATION/ REPORT OF INITIAL TREATMENT  FORM C-4    EMPLOYEE’S CLAIM - PROVIDE ALL INFORMATION REQUESTED   First Name  Nhi Last Name  Vianey Birthdate                    1965                Sex  female Claim Number   Home Address  [address] Age  52 y.o. 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  Zip  [address]    Date of Injury  6/19/2018               Hour of Injury  7:00 PM Date Employer Notified  6/20/2018 Last Day of Work after Injury or Occupational Disease  6/19/2018 Supervisor to Whom Injury Reported  Radha Rios   Address or Location of Accident (if applicable)  [[facility] ambulance parking lot ]   What were you doing at the time of accident? (if applicable)  Exiting Ambulance    How did this injury or occupational disease occur? (Be specific an answer in detail. Use additional sheet if necessary)  I grabbed the webbing on the side door of the ambulance to support my weight and it was too high. I over extended from R shoulder to left leg causing pain in lumbar spine.   If you believe that you have an occupational disease, when did you first have knowledge of the disability and it relationship to your employment?  n/a Witnesses to the Accident  n/a      Nature of Injury or Occupational Disease  Strain  Part(s) of Body Injured or Affected  Lower Back Area (Lumbar Area & Lumbo-Sacral), Defer, Defer    I certify that the above is true and correct to the best of my " knowledge and that I have provided this information in order to obtain the benefits of [place]  I hereby authorize any physician, chiropractor, surgeon, practitioner, or other person, any hospital, including [facility] or [facility], any medical service organization, any insurance company, or other institution or organization to release to each other, any medical or other information, including benefits paid or payable, pertinent to this injury or disease, except information relative to diagnosis, treatment and/or counseling for AIDS, psychological conditions, alcohol or controlled substances, for which I must give specific authorization.  A Photostat of this authorization shall be as valid as the original.     Date   Place   Employee’s Signature   THIS REPORT MUST BE COMPLETED AND MAILED WITHIN 3 WORKING DAYS OF TREATMENT   Place  [facility]  Name of Facility  [facility]   Date  6/22/2018 Diagnosis  (S39.012A) Lumbosacral strain, initial encounter  (primary encounter diagnosis)  (Y99.0) Work related injury Is there evidence the injured employee was under the influence of alcohol and/or another controlled substance at the time of accident?   Hour  12:15 PM Description of Injury or Disease  The primary encounter diagnosis was Lumbosacral strain, initial encounter. A diagnosis of Work related injury was also pertinent to this visit.     Treatment  Rest, ice therapy, RX flexeril dn Medrol pack for spasms and inflammation, OTC ibuprofen for pain  Have you advised the patient to remain off work five days or more? No   X-Ray Findings  Negative   If Yes   From Date  To Date      From information given by the employee, together with medical evidence, can you directly connect this injury or occupational disease as job incurred?  Yes If No Full Duty  No Modified Duty  Yes   Is additional  "medical care by a physician indicated?  Yes If Modified Duty, Specify any Limitations / Restrictions  SEE restrictions   Do you know of any previous injury or disease contributing to this condition or occupational disease?                            No   Date  6/22/2018 Print Doctor’s Name Amy Mcgowan P.A.-C. I certify the employer’s copy of  this form was mailed on:   Address  [address] [place] [address] Insurer’s Use Only     [facility]  [address]    Provider’s Tax ID Number  [id number] Telephone  Dept: [phone]        lluvia-AMY Ochoa P.A.-C.   e-Signature: Dr. Rajiv Hunter, Medical Director Degree  REJI        ORIGINAL-TREATING PHYSICIAN OR CHIROPRACTOR    PAGE 2-INSURER/TPA    PAGE 3-EMPLOYER    PAGE 4-EMPLOYEE             Form C-4 (rev10/07)              BRIEF DESCRIPTION OF RIGHTS AND BENEFITS  (Pursuant to [place]RS 616C.050)    Notice of Injury or Occupational Disease (Incident Report Form C-1): If an injury or occupational disease (OD) arises out of and in the  course of employment, you must provide written notice to your employer as soon as practicable, but no later than 7 days after the accident or  OD. Your employer shall maintain a sufficient supply of the required forms.    Claim for Compensation (Form C-4): If medical treatment is sought, the form C-4 is available at the place of initial treatment. A completed  \"Claim for Compensation\" (Form C-4) must be filed within 90 days after an accident or OD. The treating physician or chiropractor must,  within 3 working days after treatment, complete and mail to the employer, the employer's insurer and third-party , the Claim for  Compensation.    Medical Treatment: If you require medical treatment for your on-the-job injury or OD, you may be required to select a physician or  chiropractor from a list provided by your workers’ compensation insurer, if it has contracted with an Organization for Managed Care (MCO) or  Preferred " Provider Organization (PPO) or providers of health care. If your employer has not entered into a contract with an MCO or PPO, you  may select a physician or chiropractor from the Panel of Physicians and Chiropractors. Any medical costs related to your industrial injury or  OD will be paid by your insurer.    Temporary Total Disability (TTD): If your doctor has certified that you are unable to work for a period of at least 5 consecutive days, or 5  cumulative days in a 20-day period, or places restrictions on you that your employer does not accommodate, you may be entitled to TTD  compensation.    Temporary Partial Disability (TPD): If the wage you receive upon reemployment is less than the compensation for TTD to which you are  entitled, the insurer may be required to pay you TPD compensation to make up the difference. TPD can only be paid for a maximum of 24  months.    Permanent Partial Disability (PPD): When your medical condition is stable and there is an indication of a PPD as a result of your injury or  OD, within 30 days, your insurer must arrange for an evaluation by a rating physician or chiropractor to determine the degree of your PPD. The  amount of your PPD award depends on the date of injury, the results of the PPD evaluation and your age and wage.    Permanent Total Disability (PTD): If you are medically certified by a treating physician or chiropractor as permanently and totally disabled  and have been granted a PTD status by your insurer, you are entitled to receive monthly benefits not to exceed 66 2/3% of your average  monthly wage. The amount of your PTD payments is subject to reduction if you previously received a PPD award.    Vocational Rehabilitation Services: You may be eligible for vocational rehabilitation services if you are unable to return to the job due to a  permanent physical impairment or permanent restrictions as a result of your injury or occupational disease.    Transportation and  Per Billy Reimbursement: You may be eligible for travel expenses and per billy associated with medical treatment.    Reopening: You may be able to reopen your claim if your condition worsens after claim closure.    Appeal Process: If you disagree with a written determination issued by the insurer or the insurer does not respond to your request, you may  appeal to the Department of Administration, , by following the instructions contained in your determination letter. You must  appeal the determination within 70 days from the date of the determination letter at [address], [address], [place], [place]  [address], or [address], [address], [place], [place] [address]. If you disagree with the  decision, you may appeal to the  Department of Administration, . You must file your appeal within 30 days from the date of the  decision  letter at [address], [address], [place], [place] [address], or [address], [place], [place] [address]. If you  disagree with a decision of an , you may file a petition for judicial review with the District Court. You must do so within 30  days of the Appeal Officer’s decision. You may be represented by an  at your own expense or you may contact the [facility] for possible  representation.    [place]  for Injured Workers (NAIW): If you disagree with a  decision, you may request that NAIW represent you  without charge at an  Hearing. For information regarding denial of benefits, you may contact the [facility] at: [address], [address], [place] [address], [phone], or [address], [place] [address], [phone]    To File a Complaint with the Division: If you wish to file a complaint with the  of the Division of Industrial Relations (DIR),  please contact the Workers’ Compensation  Section, [address], [address], [place], [place] [address], telephone [phone]) [phone], or  [address], [address], [place], [place] [address], telephone [phone].    For assistance with Workers’ Compensation Issues: you may contact the Office of the Governor Consumer Health Assistance, [address] [place] [address], [address], [place], [place] [address], Toll Free 1-946.946.6191, Web site: http://govcha.[facility].nv., E-mail  Melinda@[facility].nv.                                                                                                                                                                                                                                   __________________________________________________________________                                                                   _________________                Employee Name / Signature                                                                                                                                                       Date                                                                                                                                                                                                     D-2 (rev. 10/07)

## 2018-06-22 NOTE — PATIENT INSTRUCTIONS
Lumbosacral Strain  Lumbosacral strain is an injury that causes pain in the lower back (lumbosacral spine). This injury usually occurs from overstretching the muscles or ligaments along your spine. A strain can affect one or more muscles or cord-like tissues that connect bones to other bones (ligaments).  What are the causes?  This condition may be caused by:  · A hard, direct hit (blow) to the back.  · Excessive stretching of the lower back muscles. This may result from:  ¨ A fall.  ¨ Lifting something heavy.  ¨ Repetitive movements such as bending or crouching.  What increases the risk?  The following factors may increase your risk of getting this condition:  · Participating in sports or activities that involve:  ¨ A sudden twist of the back.  ¨ Pushing or pulling motions.  · Being overweight or obese.  · Having poor strength and flexibility, especially tight hamstrings or weak muscles in the back or abdomen.  · Having too much of a curve in the lower back.  · Having a pelvis that is tilted forward.  What are the signs or symptoms?  The main symptom of this condition is pain in the lower back, at the site of the strain. Pain may extend (radiate) down one or both legs.  How is this diagnosed?  This condition is diagnosed based on:  · Your symptoms.  · Your medical history.  · A physical exam.  ¨ Your health care provider may push on certain areas of your back to determine the source of your pain.  ¨ You may be asked to bend forward, backward, and side to side to assess the severity of your pain and your range of motion.  · Imaging tests, such as:  ¨ X-rays.  ¨ MRI.  How is this treated?  Treatment for this condition may include:  · Putting heat and cold on the affected area.  · Medicines to help relieve pain and relax your muscles (muscle relaxants).  · NSAIDs to help reduce swelling and discomfort.  When your symptoms improve, it is important to gradually return to your normal routine as soon as possible to reduce  pain, avoid stiffness, and avoid loss of muscle strength. Generally, symptoms should improve within 6 weeks of treatment. However, recovery time varies.  Follow these instructions at home:  Managing pain, stiffness, and swelling  · If directed, put ice on the injured area during the first 24 hours after your strain.  ¨ Put ice in a plastic bag.  ¨ Place a towel between your skin and the bag.  ¨ Leave the ice on for 20 minutes, 2-3 times a day.  · If directed, put heat on the affected area as often as told by your health care provider. Use the heat source that your health care provider recommends, such as a moist heat pack or a heating pad.  ¨ Place a towel between your skin and the heat source.  ¨ Leave the heat on for 20-30 minutes.  ¨ Remove the heat if your skin turns bright red. This is especially important if you are unable to feel pain, heat, or cold. You may have a greater risk of getting burned.  Activity  · Rest and return to your normal activities as told by your health care provider. Ask your health care provider what activities are safe for you.  · Avoid activities that take a lot of energy for as long as told by your health care provider.  General instructions  · Take over-the-counter and prescription medicines only as told by your health care provider.  · Do not drive or use heavy machinery while taking prescription pain medicine.  · Do not use any products that contain nicotine or tobacco, such as cigarettes and e-cigarettes. If you need help quitting, ask your health care provider.  · Keep all follow-up visits as told by your health care provider. This is important.  How is this prevented?  · Use correct form when playing sports and lifting heavy objects.  · Use good posture when sitting and standing.  · Maintain a healthy weight.  · Sleep on a mattress with medium firmness to support your back.  · Be safe and responsible while being active to avoid falls.  · Do at least 150 minutes of  moderate-intensity exercise each week, such as brisk walking or water aerobics. Try a form of exercise that takes stress off your back, such as swimming or stationary cycling.  · Maintain physical fitness, including:  ¨ Strength.  ¨ Flexibility.  ¨ Cardiovascular fitness.  ¨ Endurance.  Contact a health care provider if:  · Your back pain does not improve after 6 weeks of treatment.  · Your symptoms get worse.  Get help right away if:  · Your back pain is severe.  · You cannot stand or walk.  · You have difficulty controlling when you urinate or when you have a bowel movement.  · You feel nauseous or you vomit.  · Your feet get very cold.  · You have numbness, tingling, weakness, or problems using your arms or legs.  · You develop any of the following:  ¨ Shortness of breath.  ¨ Dizziness.  ¨ Pain in your legs.  ¨ Weakness in your buttocks or legs.  ¨ Discoloration of the skin on your toes or legs.  This information is not intended to replace advice given to you by your health care provider. Make sure you discuss any questions you have with your health care provider.  Document Released: 09/27/2006 Document Revised: 07/07/2017 Document Reviewed: 05/21/2017  [employer] Patient Education © 2017 [employer]

## 2018-06-23 NOTE — PROGRESS NOTES
Subjective:      Pt is a 52 y.o. 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 Pt notes swelling, tightness muscle spasms and pain. Pt denies loss of bowel or bladder control. Pt has not taken any Rx medications for this condition. Pt states the pain is a 7/10, aching in nature and worse at night. Pt denies CP, SOB, NVD, paresthesias, headaches, dizziness, change in vision, hives, or other joint pain. The pt's medication list, problem list, and allergies have been evaluated and reviewed during today's visit.         HPI  PMH:  Past Medical History:   Diagnosis Date   • Elevated blood pressure reading 3/29/2018   • Family history of alcoholism 9/23/2011   • Foot pain 9/23/2011   • History of depression 9/23/2011   • Neutrophilia 3/29/2018   • Pain     RUQ PAIN       PSH:  Past Surgical History:   Procedure Laterality Date   • JOHNNA BY LAPAROSCOPY  4/8/2011    Performed by MONIK LONDONO at SURGERY [facility]   • OTHER  1981    BREAST REDUCTION   • APPENDECTOMY     • KNEE ARTHROSCOPY      menisectomy   • NERVE ULNAR TRANSFER     • OTHER ABDOMINAL SURGERY      appy 91   • OTHER ORTHOPEDIC SURGERY      r elbow surgery   • OTHER ORTHOPEDIC SURGERY      r knee   • OVARIAN CYSTECTOMY         Fam Hx:    She was adopted. Family history is unknown by patient.  Family Status   Relation Status   • Mother    • Father        Soc HX:  Social History     Social History   • Marital status:      Spouse name: N/A   • Number of children: N/A   • Years of education: N/A     Occupational History   • Not on file.     Social History Main Topics   • Smoking status: Former Smoker     Packs/day: 0.50     Years:  "20.00     Quit date: 10/12/1994   • Smokeless tobacco: Never Used      Comment: avoid any and all tobacco products   • Alcohol use 0.0 oz/week      Comment: occ, h/o heavy ETOH use in rehab 1994   • Drug use: No   • Sexual activity: Yes     Birth control/ protection: Sponge     Other Topics Concern   • Not on file     Social History Narrative   • No narrative on file         Medications:    Current Outpatient Prescriptions:   •  MethylPREDNISolone (MEDROL DOSEPAK) 4 MG Tablet Therapy Pack, Use as directed, Disp: 21 Tab, Rfl: 0  •  cyclobenzaprine (FLEXERIL) 5 MG tablet, Take 1-2 Tabs by mouth 3 times a day as needed., Disp: 30 Tab, Rfl: 0  •  ibuprofen (MOTRIN) 400 MG Tab, Take 400 mg by mouth every 6 hours as needed., Disp: , Rfl:   •  fluticasone (FLONASE) 50 MCG/ACT nasal spray, Spray 1 Spray in nose every day., Disp: , Rfl:       Allergies:  Patient has no known allergies.    ROS  Constitutional: Negative for fever, chills and malaise/fatigue.   HENT: Negative for congestion and sore throat.    Eyes: Negative for blurred vision, double vision and photophobia.   Respiratory: Negative for cough and shortness of breath.    Cardiovascular: Negative for chest pain and palpitations.   Gastrointestinal: Negative for heartburn, nausea, vomiting, abdominal pain, diarrhea and constipation.   Genitourinary: Negative for dysuria and flank pain.   Musculoskeletal: POS for lumbar joint pain and myalgias.   Skin: Negative for itching and rash.   Neurological: Negative for dizziness, tingling and headaches.   Endo/Heme/Allergies: Does not bruise/bleed easily.   Psychiatric/Behavioral: Negative for depression. The patient is not nervous/anxious.           Objective:     /90   Pulse 92   Temp 36.8 °C (98.3 °F)   Resp 16   Ht 1.702 m (5' 7\")   Wt 84.4 kg (186 lb)   SpO2 98%   BMI 29.13 kg/m²      Physical Exam    Lumbar spine: Upon inspection, no ecchymoses, no edema, no erythema. +TTP about paraspinal muscles, BLE: " +AROM, +SILT, STR 5/5, DP 2+ B/L     Constitutional: PT is oriented to person, place, and time. PT appears well-developed and well-nourished. No distress.   HENT:   Head: Normocephalic and atraumatic.   Mouth/Throat: Oropharynx is clear and moist. No oropharyngeal exudate.   Eyes: Conjunctivae normal and EOM are normal. Pupils are equal, round, and reactive to light.   Neck: Normal range of motion. Neck supple. No thyromegaly present.   Cardiovascular: Normal rate, regular rhythm, normal heart sounds and intact distal pulses.  Exam reveals no gallop and no friction rub.    No murmur heard.  Pulmonary/Chest: Effort normal and breath sounds normal. No respiratory distress. PT has no wheezes. PT has no rales. Pt exhibits no tenderness.   Abdominal: Soft. Bowel sounds are normal. PT exhibits no distension and no mass. There is no tenderness. There is no rebound and no guarding.   Neurological: PT is alert and oriented to person, place, and time. PT has normal reflexes. No cranial nerve deficit.   Skin: Skin is warm and dry. No rash noted. PT is not diaphoretic. No erythema.       Psychiatric: PT has a normal mood and affect. PT behavior is normal. Judgment and thought content normal.     RADS:  Narrative       6/22/2018 12:28 PM    HISTORY/REASON FOR EXAM:  Pain Following Trauma.      TECHNIQUE/ EXAM DESCRIPTION AND NUMBER OF VIEWS:  3 views of the lumbar spine.    COMPARISON: None.    FINDINGS:  Mild left convex scoliosis lumbar spine.  Lumbar vertebral body heights preserved.  Mild multilevel degenerative disc disease.  Multilevel facet joint arthrosis moderate at L4-5 and L5-S1.  SI joints are minimally sclerotic.  Right upper quadrant surgical clips.   Impression       Mild degenerative changes.  No acute compression identified. If symptoms are persistent, CT would be recommended for further evaluation.   Reading Provider Reading Date   Gerber Nicolas M.D. Jun 22, 2018   Signing Provider Signing Date Signing Time   Gerber  CELSO Nicolas Jun 22, 2018 12:50 PM          Assessment/Plan:     1. Lumbosacral strain, initial encounter    - DX-LUMBAR SPINE-2 OR 3 VIEWS; Future  - MethylPREDNISolone (MEDROL DOSEPAK) 4 MG Tablet Therapy Pack; Use as directed  Dispense: 21 Tab; Refill: 0  - cyclobenzaprine (FLEXERIL) 5 MG tablet; Take 1-2 Tabs by mouth 3 times a day as needed.  Dispense: 30 Tab; Refill: 0  - POCT Breath Alcohol Test  - POCT 6 Panel Urine Drug Screen    2. Work related injury    - DX-LUMBAR SPINE-2 OR 3 VIEWS; Future  - MethylPREDNISolone (MEDROL DOSEPAK) 4 MG Tablet Therapy Pack; Use as directed  Dispense: 21 Tab; Refill: 0  - cyclobenzaprine (FLEXERIL) 5 MG tablet; Take 1-2 Tabs by mouth 3 times a day as needed.  Dispense: 30 Tab; Refill: 0  - POCT Breath Alcohol Test  - POCT 6 Panel Urine Drug Screen    3. Pre-employment drug screening    RICE therapy discussed  Gentle ROM exercises discussed  WBAT BLE/BUE with caution  Ice/heat therapy discussed  OTC ibuprofen for pain control  Rest, fluids encouraged.  AVS with medical info given.  Pt was in full understanding and agreement with the plan.  Follow-up 3 days for next WC appt

## 2018-06-26 ENCOUNTER — OCCUPATIONAL MEDICINE (OUTPATIENT)
Dept: OCCUPATIONAL MEDICINE | Facility: CLINIC | Age: 53
End: 2018-06-26
Payer: COMMERCIAL

## 2018-06-26 VITALS
BODY MASS INDEX: 29.19 KG/M2 | SYSTOLIC BLOOD PRESSURE: 152 MMHG | TEMPERATURE: 97.7 F | WEIGHT: 186 LBS | OXYGEN SATURATION: 98 % | RESPIRATION RATE: 16 BRPM | HEIGHT: 67 IN | DIASTOLIC BLOOD PRESSURE: 90 MMHG | HEART RATE: 86 BPM

## 2018-06-26 DIAGNOSIS — S29.019D THORACIC MYOFASCIAL STRAIN, SUBSEQUENT ENCOUNTER: ICD-10-CM

## 2018-06-26 PROCEDURE — 99203 OFFICE O/P NEW LOW 30 MIN: CPT | Performed by: PREVENTIVE MEDICINE

## 2018-06-26 ASSESSMENT — PAIN SCALES - GENERAL: PAINLEVEL: 5=MODERATE PAIN

## 2018-06-26 NOTE — PROGRESS NOTES
Subjective:      Nhi Winters is a 52 y.o. female who presents with Other (WC [facility] [employer] [facility] 6/19/18 back, same, Rm 2)      Date of injury 6/19/28. Mechanism of injury-exiting ambulance, strained right thoracolumbar back suspending from webbing. 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 She was seen in urgent care a few days ago her x-rays were negative. She was given steroids and muscle relaxants. She reports 60% improvement. She continues to have right-sided thoracic pain without radiation. No pleuritic pain. She did have an episode of tingling in both legs from hips to ankles which lasted about a minute of uncertain significance.     HPI    ROS  Comprehensive medical history form reviewed. Pertinent positives and negatives included in HPI.    PFSH: reviewed in Epic    PMH:  has a past medical history of Elevated blood pressure reading (3/29/2018); Family history of alcoholism (9/23/2011); Foot pain (9/23/2011); History of depression (9/23/2011); Neutrophilia (3/29/2018); and Pain.  MEDS:   Current Outpatient Prescriptions:   •  MethylPREDNISolone (MEDROL DOSEPAK) 4 MG Tablet Therapy Pack, Use as directed, Disp: 21 Tab, Rfl: 0  •  cyclobenzaprine (FLEXERIL) 5 MG tablet, Take 1-2 Tabs by mouth 3 times a day as needed., Disp: 30 Tab, Rfl: 0  •  ibuprofen (MOTRIN) 400 MG Tab, Take 400 mg by mouth every 6 hours as needed., Disp: , Rfl:   •  fluticasone (FLONASE) 50 MCG/ACT nasal spray, Spray 1 Spray in nose every day., Disp: , Rfl:   ALLERGIES: No Known Allergies  SURGHX:   Past Surgical History:   Procedure Laterality Date   • JOHNNA BY LAPAROSCOPY  4/8/2011    Performed by MONIK LONDONO at [facility]   • OTHER  1981    BREAST REDUCTION   • APPENDECTOMY     • KNEE ARTHROSCOPY      menisectomy   • NERVE ULNAR TRANSFER     • OTHER ABDOMINAL SURGERY      appy 91   • OTHER ORTHOPEDIC SURGERY      r elbow surgery   • OTHER ORTHOPEDIC SURGERY      r knee   • OVARIAN CYSTECTOMY  "      SOCHX:  reports that she quit smoking about 23 years ago. She has a 10.00 pack-year smoking history. She has never used smokeless tobacco. She reports that she drinks alcohol. She reports that she does not use drugs.  Work Status: Flight paramedic with [employer]  FH: No pertinent hereditary disorders.        Objective:     /90   Pulse 86   Temp 36.5 °C (97.7 °F)   Resp 16   Ht 1.702 m (5' 7\")   Wt 84.4 kg (186 lb)   SpO2 98%   BMI 29.13 kg/m²      Physical Exam    Appearance: Well-developed, well-nourished.   Mental Status: Mood and Affect normal. Pleasant. Cooperative. Appropriate.   ENT: Oropharynx clear. Moist mucous membranes. Hearing normal.   Eyes: Pupils reactive. Conjunctiva normal. No scleral icterus.   Neck: Trachea Midline. No thyromegaly. No masses.  Cardiovascular: Normal rate. Regular rhythm. Normal heart sounds.   Chest: Effort normal. Breath sounds clear.   Skin: Skin is warm and dry. No rash.   Musculoskeletal: Back exam shows mild tenderness in the right thoracic paraspinal area. Straight leg raise is negative. Good range of motion.         Assessment/Plan:     1. Thoracic myofascial strain, subsequent encounter  New to occupational health from urgent care  Condition improved but not resolved  Remains at restricted work  Recheck in one week when if not improved will consider physical therapy enrollment      "

## 2018-06-26 NOTE — LETTER
[address],   [address] [place] [address]  Phone:  [phone] - Fax:  [phone]   Occupational Health [facility] Progress Report and Disability Certification  Date of Service: 6/26/2018   No Show:  No  Date / Time of Next Visit: 7/10/2018 @ 8:15 AM   Claim Information   Patient Name: Nhi Winters  Claim Number:     Employer: [employer]  Date of Injury: 6/19/2018     Insurer / TPA: [employer]  ID / SSN:     Occupation: Paramedic  Diagnosis: The encounter diagnosis was Thoracic myofascial strain, subsequent encounter.    Medical Information   Related to Industrial Injury? Yes    Subjective Complaints:  Date of injury 6/19/28. Mechanism of injury-exiting ambulance, strained right thoracolumbar back suspending from webbing. 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 She was seen in urgent care a few days ago her x-rays were negative. She was given steroids and muscle relaxants. She reports 60% improvement. She continues to have right-sided thoracic pain without radiation. No pleuritic pain. She did have an episode of tingling in both legs from hips to ankles which lasted about a minute of uncertain significance.   Objective Findings: Appearance: Well-developed, well-nourished.   Mental Status: Mood and Affect normal. Pleasant. Cooperative. Appropriate.   ENT: Oropharynx clear. Moist mucous membranes. Hearing normal.   Eyes: Pupils reactive. Conjunctiva normal. No scleral icterus.   Neck: Trachea Midline. No thyromegaly. No masses.  Cardiovascular: Normal rate. Regular rhythm. Normal heart sounds.   Chest: Effort normal. Breath sounds clear.   Skin: Skin is warm and dry. No rash.   Musculoskeletal: Back exam shows mild tenderness in the right thoracic paraspinal area. Straight leg raise is negative. Good range of motion.     Pre-Existing Condition(s):     Assessment:   Condition Improved    Status: Additional Care Required  Permanent  Disability:No    Plan:      Diagnostics:      Comments:       Disability Information   Status: Released to Restricted Duty    From:  6/26/2018  Through: 7/3/2018 Restrictions are:     Physical Restrictions   Sitting:    Standing:    Stooping:    Bending:      Squatting:    Walking:    Climbing:    Pushing:      Pulling:    Other:    Reaching Above Shoulder (L):   Reaching Above Shoulder (R):       Reaching Below Shoulder (L):    Reaching Below Shoulder (R):      Not to exceed Weight Limits   Carrying(hrs):   Weight Limit(lb):   Lifting(hrs):   Weight  Limit(lb): < or = to 10 pounds   Comments:      Repetitive Actions   Hands: i.e. Fine Manipulations from Grasping:     Feet: i.e. Operating Foot Controls:     Driving / Operate Machinery:     Physician Name: Matthias Whyte M.D. Physician Signature: MATTHIAS Rivas M.D. e-Signature: Dr. Rajiv Hunter, Medical Director   Clinic Name / Location: [address],   [address] [place] [address] Clinic Phone Number: Dept: [phone]   Appointment Time: 8:30 Am Visit Start Time: 8:29 AM   Check-In Time:  8:18 Am Visit Discharge Time:  9:15 AM   Original-Treating Physician or Chiropractor    Page 2-Insurer/TPA    Page 3-Employer    Page 4-Employee

## 2018-07-02 ENCOUNTER — OCCUPATIONAL MEDICINE (OUTPATIENT)
Dept: URGENT CARE | Facility: PHYSICIAN GROUP | Age: 53
End: 2018-07-02
Payer: COMMERCIAL

## 2018-07-02 VITALS
SYSTOLIC BLOOD PRESSURE: 128 MMHG | TEMPERATURE: 97.6 F | DIASTOLIC BLOOD PRESSURE: 72 MMHG | HEART RATE: 84 BPM | BODY MASS INDEX: 29.19 KG/M2 | HEIGHT: 67 IN | RESPIRATION RATE: 18 BRPM | WEIGHT: 186 LBS | OXYGEN SATURATION: 100 %

## 2018-07-02 DIAGNOSIS — S29.019D THORACIC MYOFASCIAL STRAIN, SUBSEQUENT ENCOUNTER: ICD-10-CM

## 2018-07-02 PROCEDURE — 99213 OFFICE O/P EST LOW 20 MIN: CPT | Performed by: PHYSICIAN ASSISTANT

## 2018-07-02 ASSESSMENT — ENCOUNTER SYMPTOMS
DIZZINESS: 0
CHILLS: 0
VOMITING: 0
NAUSEA: 0
ABDOMINAL PAIN: 0
BACK PAIN: 0
MUSCULOSKELETAL NEGATIVE: 1
SHORTNESS OF BREATH: 0
FEVER: 0
DIARRHEA: 0

## 2018-07-02 NOTE — LETTER
[facility]  [address] [place] [address]. [address] - [place] [address]  Phone:  [phone] - Fax:  [phone]   [facility] Progress Report and Disability Certification  Date of Service: 7/2/2018   No Show:  No  Date / Time of Next Visit:     Claim Information   Patient Name: Nhi Winters  Claim Number:     Employer: [employer]  Date of Injury: 6/19/2018     Insurer / TPA: [employer]  ID / SSN:     Occupation: Paramedic  Diagnosis: The encounter diagnosis was Thoracic myofascial strain, subsequent encounter.    Medical Information   Related to Industrial Injury? Yes    Subjective Complaints:  Date of injury 6/19/28. Mechanism of injury-exiting ambulance, strained right thoracolumbar back suspending from webbing. 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 X-rays were negative. She was given steroids and muscle relaxants. She presents today for 3rd visit and reports 100% improvement. She is requesting to return to full duty.    Objective Findings: Musculoskeletal:        Lumbar back: She exhibits normal range of motion, no tenderness, no bony tenderness, no pain and no spasm.      Pre-Existing Condition(s): None   Assessment:   Condition Improved    Status: Discharged /  MMI  Permanent Disability:No    Plan:      Diagnostics:   Comments:negative    Comments:       Disability Information   Status: Released to Full Duty    From:     Through:   Restrictions are:     Physical Restrictions   Sitting:    Standing:    Stooping:    Bending:      Squatting:    Walking:    Climbing:    Pushing:      Pulling:    Other:    Reaching Above Shoulder (L):   Reaching Above Shoulder (R):       Reaching Below Shoulder (L):    Reaching Below Shoulder (R):      Not to exceed Weight Limits   Carrying(hrs):   Weight Limit(lb):   Lifting(hrs):   Weight  Limit(lb):     Comments: Patient's symptoms completely resolved  Discharged/MMI today    Repetitive Actions   Hands: i.e.  Fine Manipulations from Grasping:     Feet: i.e. Operating Foot Controls:     Driving / Operate Machinery:     Physician Name: Jaqueline Whitman P.A.-C. Physician Signature: JAQUELINE Cannon P.A.-C. e-Signature: Dr. Rajiv Hunter, Medical Director   Clinic Name / Location: [address]  [place] [address] Clinic Phone Number: Dept: [phone]   Appointment Time: 8:00 Am Visit Start Time: 8:16 AM   Check-In Time:  8:00 Am Visit Discharge Time:  9:01AM   Original-Treating Physician or Chiropractor    Page 2-Insurer/TPA    Page 3-Employer    Page 4-Employee

## 2018-07-02 NOTE — PROGRESS NOTES
"Subjective:      Nhi Winters is a 52 y.o. female who presents with Back Pain (WC/FV)      Date of injury 6/19/28. Mechanism of injury-exiting ambulance, strained right thoracolumbar back suspending from webbing. 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 X-rays were negative. She was given steroids and muscle relaxants. She presents today for 3rd visit and reports 100% improvement. She is requesting to return to full duty.      HPI    Review of Systems   Constitutional: Negative for chills and fever.   HENT: Negative for congestion.    Respiratory: Negative for shortness of breath.    Cardiovascular: Negative for chest pain.   Gastrointestinal: Negative for abdominal pain, diarrhea, nausea and vomiting.   Genitourinary: Negative.    Musculoskeletal: Negative.  Negative for back pain.   Skin: Negative for rash.   Neurological: Negative for dizziness.        Objective:     /72   Pulse 84   Temp 36.4 °C (97.6 °F)   Resp 18   Ht 1.702 m (5' 7\")   Wt 84.4 kg (186 lb)   SpO2 100%   BMI 29.13 kg/m²      Physical Exam   Constitutional: She is oriented to person, place, and time. She appears well-developed and well-nourished. No distress.   HENT:   Head: Normocephalic and atraumatic.   Eyes: Pupils are equal, round, and reactive to light.   Neck: Normal range of motion.   Cardiovascular: Normal rate.    Pulmonary/Chest: Effort normal.   Musculoskeletal: Normal range of motion.        Lumbar back: She exhibits normal range of motion, no tenderness, no bony tenderness, no pain and no spasm.   Neurological: She is alert and oriented to person, place, and time.   Skin: Skin is warm and dry. She is not diaphoretic.   Psychiatric: She has a normal mood and affect. Her behavior is normal.   Nursing note and vitals reviewed.         PMH:  has a past medical history of Elevated blood pressure reading (3/29/2018); Family history of alcoholism (9/23/2011); Foot pain (9/23/2011); History " of depression (9/23/2011); Neutrophilia (3/29/2018); and Pain.  MEDS:   Current Outpatient Prescriptions:   •  MethylPREDNISolone (MEDROL DOSEPAK) 4 MG Tablet Therapy Pack, Use as directed, Disp: 21 Tab, Rfl: 0  •  cyclobenzaprine (FLEXERIL) 5 MG tablet, Take 1-2 Tabs by mouth 3 times a day as needed., Disp: 30 Tab, Rfl: 0  •  ibuprofen (MOTRIN) 400 MG Tab, Take 400 mg by mouth every 6 hours as needed., Disp: , Rfl:   •  fluticasone (FLONASE) 50 MCG/ACT nasal spray, Spray 1 Spray in nose every day., Disp: , Rfl:   ALLERGIES: No Known Allergies  SURGHX:   Past Surgical History:   Procedure Laterality Date   • JOHNNA BY LAPAROSCOPY  4/8/2011    Performed by MONIK LONDONO at [facility] ORS   • OTHER  1981    BREAST REDUCTION   • APPENDECTOMY     • KNEE ARTHROSCOPY      menisectomy   • NERVE ULNAR TRANSFER     • OTHER ABDOMINAL SURGERY      appy 91   • OTHER ORTHOPEDIC SURGERY      r elbow surgery   • OTHER ORTHOPEDIC SURGERY      r knee   • OVARIAN CYSTECTOMY       SOCHX:  reports that she quit smoking about 23 years ago. She has a 10.00 pack-year smoking history. She has never used smokeless tobacco. She reports that she drinks alcohol. She reports that she does not use drugs.  FH: She was adopted. Family history is unknown by patient.       Assessment/Plan:     1. Thoracic myofascial strain, subsequent encounter    Patient's symptoms completely resolved   Discharged/MMI today

## 2018-07-31 ENCOUNTER — HOSPITAL ENCOUNTER (OUTPATIENT)
Facility: MEDICAL CENTER | Age: 53
End: 2018-07-31
Attending: FAMILY MEDICINE
Payer: COMMERCIAL

## 2018-07-31 ENCOUNTER — OFFICE VISIT (OUTPATIENT)
Dept: MEDICAL GROUP | Facility: LAB | Age: 53
End: 2018-07-31
Payer: COMMERCIAL

## 2018-07-31 VITALS
BODY MASS INDEX: 29.03 KG/M2 | TEMPERATURE: 98 F | SYSTOLIC BLOOD PRESSURE: 126 MMHG | OXYGEN SATURATION: 97 % | WEIGHT: 185 LBS | RESPIRATION RATE: 12 BRPM | HEART RATE: 88 BPM | HEIGHT: 67 IN | DIASTOLIC BLOOD PRESSURE: 88 MMHG

## 2018-07-31 DIAGNOSIS — Z12.4 SCREENING FOR MALIGNANT NEOPLASM OF CERVIX: ICD-10-CM

## 2018-07-31 DIAGNOSIS — Z11.51 SCREENING FOR HPV (HUMAN PAPILLOMAVIRUS): ICD-10-CM

## 2018-07-31 DIAGNOSIS — Z01.419 WELL WOMAN EXAM WITH ROUTINE GYNECOLOGICAL EXAM: ICD-10-CM

## 2018-07-31 DIAGNOSIS — M71.21 BAKER'S CYST OF KNEE, RIGHT: ICD-10-CM

## 2018-07-31 DIAGNOSIS — Z12.11 SCREENING FOR MALIGNANT NEOPLASM OF COLON: ICD-10-CM

## 2018-07-31 PROCEDURE — 99396 PREV VISIT EST AGE 40-64: CPT | Performed by: FAMILY MEDICINE

## 2018-07-31 PROCEDURE — 87624 HPV HI-RISK TYP POOLED RSLT: CPT

## 2018-07-31 PROCEDURE — 99000 SPECIMEN HANDLING OFFICE-LAB: CPT | Performed by: FAMILY MEDICINE

## 2018-07-31 PROCEDURE — 88175 CYTOPATH C/V AUTO FLUID REDO: CPT

## 2018-07-31 NOTE — PROGRESS NOTES
SUBJECTIVE: 52 y.o.  female for annual routine gynecologic exam    Obstetric History       T0      L0     SAB0   TAB0   Ectopic0   Molar0   Multiple0   Live Births0       Last Pap:   History   Sexual Activity   • Sexual activity: Yes   • Birth control/ protection: Sponge     Sexual history: previously sexually active but currently abstaining   H/O Abnormal Pap no  She  reports that she quit smoking about 23 years ago. She has a 10.00 pack-year smoking history. She has never used smokeless tobacco.        Allergies: Patient has no known allergies.     ROS:    POSTMENOPAUSAL  Reports moderate menopause symptoms of hot flashes, night sweats, sleep disruption, mood changes.Reports vaginal dryness.   No significant bloating/fluid retention, pelvic pain, or dyspareunia. No vaginal discharge   No breast tenderness, mass, nipple discharge, changes in size or contour, or abnormal cyclic discomfort.  No urinary tract symptoms, no incontinence, no polydipsia, polyuria,  No abdominal pain, change in bowel habits, black or bloody stools.    No unusual headaches, no visual changes, menstrual migraines   No prolonged cough. No dyspnea or chest pain on exertion.  No depression, labile mood, anxiety, libido changes, insomnia.  No temperature intolerance.  No new/concerning skin lesions, concerns.     Exercise: moderate regular exercise program    Preventive Care:  HPV vaccine: no  Mammogram: Last done 2018, up to date  Colonoscopy: Last done age 38, due,  DEXA: n/i  Tetanus booster: Last done   Pneumonia vaccine: n/i  Shingles vaccine: Not done   Flu vaccine: Done yearly   ASA: not taking      Current medicines (including changes today)  Current Outpatient Prescriptions   Medication Sig Dispense Refill   • cyclobenzaprine (FLEXERIL) 5 MG tablet Take 1-2 Tabs by mouth 3 times a day as needed. 30 Tab 0   • ibuprofen (MOTRIN) 400 MG Tab Take 400 mg by mouth every 6 hours as needed.     • fluticasone  "(FLONASE) 50 MCG/ACT nasal spray Spray 1 Spray in nose every day.       No current facility-administered medications for this visit.      She  has a past medical history of Elevated blood pressure reading (3/29/2018); Family history of alcoholism (9/23/2011); Foot pain (9/23/2011); History of depression (9/23/2011); Neutrophilia (3/29/2018); and Pain.  She  has a past surgical history that includes other abdominal surgery; other (1981); other orthopedic surgery; other orthopedic surgery; kimi by laparoscopy (4/8/2011); appendectomy; knee arthroscopy; ovarian cystectomy; and nerve ulnar transfer.     Family History:   Family History   Problem Relation Age of Onset   • Adopted: Yes   • Family history unknown: Yes       OBJECTIVE:   /88   Pulse 88   Temp 36.7 °C (98 °F)   Resp 12   Ht 1.702 m (5' 7\")   Wt 83.9 kg (185 lb)   SpO2 97%   BMI 28.98 kg/m²   Body mass index is 28.98 kg/m².    HEENT: NC/AT, MMM, oropharynx clear  NECK:  No cervical or supraclavicular MARGE  NEURO: Cranial nerves II-XII grossly intact  CARDIOVASCULAR:  Regular rate and rhythm.  S1 and S2 normal.  No edema  LUNGS: CTAB  ABDOMEN:  Soft without tenderness, guarding, mass or organomegaly.  No CVA tenderness or inguinal adenopathy.   EXTREMITIES:  peripheral pulses are 2+.   SKIN: color normal, vascularity normal, temperature normal. No rashes or suspicious skin lesions noted.  BREAST: Performed with instruction during examination. No axillary lymphadenopathy, no skin changes, no dominant masses. No nipple retraction  Pelvic Exam -  Normal external genitalia with no lesions. Normal vaginal mucosa with normal rugation and no discharge. Cervix with no visible lesions. No cervical motion tenderness. Uterus is normal sized with no masses. No adnexal tenderness or enlargement appreciated. Thin Prep Pap is obtained, vaginal swab is not obtained and specimen(s) sent to lab    <ASSESSMENT and PLAN>  1. Well woman exam with routine gynecological " exam  2. Screening for malignant neoplasm of cervix  3. Screening for HPV (human papillomavirus)  Age-appropriate counseling given, labs reviewed from April 2018, recommended shingles vaccine which she will schedule because we do not have currently in the office.  We will order colon cancer screening.  She is going to try to schedule a colonoscopy in the meantime  - THINPREP PAP WITH HPV; Future    4. Screening for malignant neoplasm of colon  - COLOGUARD (FIT DNA)        Discussed  breast self exam, mammography screening, menopause, adequate intake of calcium and vitamin D, diet and exercise   Follow-up in 3 years for next Pap if results are normal.   Next office visit for recheck of chronic medical conditions is due in 6 months FOR ELEVATED BP (CAME DOWN ON RECHECK TODAY)

## 2018-07-31 NOTE — PATIENT INSTRUCTIONS
"DASH Eating Plan  DASH stands for \"Dietary Approaches to Stop Hypertension.\" The DASH eating plan is a healthy eating plan that has been shown to reduce high blood pressure (hypertension). Additional health benefits may include reducing the risk of type 2 diabetes mellitus, heart disease, and stroke. The DASH eating plan may also help with weight loss.  What do I need to know about the DASH eating plan?  For the DASH eating plan, you will follow these general guidelines:  · Choose foods with less than 150 milligrams of sodium per serving (as listed on the food label).  · Use salt-free seasonings or herbs instead of table salt or sea salt.  · Check with your health care provider or pharmacist before using salt substitutes.  · Eat lower-sodium products. These are often labeled as \"low-sodium\" or \"no salt added.\"  · Eat fresh foods. Avoid eating a lot of canned foods.  · Eat more vegetables, fruits, and low-fat dairy products.  · Choose whole grains. Look for the word \"whole\" as the first word in the ingredient list.  · Choose fish and skinless chicken or turkey more often than red meat. Limit fish, poultry, and meat to 6 oz (170 g) each day.  · Limit sweets, desserts, sugars, and sugary drinks.  · Choose heart-healthy fats.  · Eat more home-cooked food and less restaurant, buffet, and fast food.  · Limit fried foods.  · Do not west foods. Cook foods using methods such as baking, boiling, grilling, and broiling instead.  · When eating at a restaurant, ask that your food be prepared with less salt, or no salt if possible.  What foods can I eat?  Seek help from a dietitian for individual calorie needs.  Grains   Whole grain or whole wheat bread. Brown rice. Whole grain or whole wheat pasta. Quinoa, bulgur, and whole grain cereals. Low-sodium cereals. Corn or whole wheat flour tortillas. Whole grain cornbread. Whole grain crackers. Low-sodium crackers.  Vegetables   Fresh or frozen vegetables (raw, steamed, roasted, or " grilled). Low-sodium or reduced-sodium tomato and vegetable juices. Low-sodium or reduced-sodium tomato sauce and paste. Low-sodium or reduced-sodium canned vegetables.  Fruits   All fresh, canned (in natural juice), or frozen fruits.  Meat and Other Protein Products   Ground beef (85% or leaner), grass-fed beef, or beef trimmed of fat. Skinless chicken or turkey. Ground chicken or turkey. Pork trimmed of fat. All fish and seafood. Eggs. Dried beans, peas, or lentils. Unsalted nuts and seeds. Unsalted canned beans.  Dairy   Low-fat dairy products, such as skim or 1% milk, 2% or reduced-fat cheeses, low-fat ricotta or cottage cheese, or plain low-fat yogurt. Low-sodium or reduced-sodium cheeses.  Fats and Oils   Tub margarines without trans fats. Light or reduced-fat mayonnaise and salad dressings (reduced sodium). Avocado. Safflower, olive, or canola oils. Natural peanut or almond butter.  Other   Unsalted popcorn and pretzels.  The items listed above may not be a complete list of recommended foods or beverages. Contact your dietitian for more options.   What foods are not recommended?  Grains   White bread. White pasta. White rice. Refined cornbread. Bagels and croissants. Crackers that contain trans fat.  Vegetables   Creamed or fried vegetables. Vegetables in a cheese sauce. Regular canned vegetables. Regular canned tomato sauce and paste. Regular tomato and vegetable juices.  Fruits   Canned fruit in light or heavy syrup. Fruit juice.  Meat and Other Protein Products   Fatty cuts of meat. Ribs, chicken wings, stout, sausage, bologna, salami, chitterlings, fatback, hot dogs, bratwurst, and packaged luncheon meats. Salted nuts and seeds. Canned beans with salt.  Dairy   Whole or 2% milk, cream, half-and-half, and cream cheese. Whole-fat or sweetened yogurt. Full-fat cheeses or blue cheese. Nondairy creamers and whipped toppings. Processed cheese, cheese spreads, or cheese curds.  Condiments   Onion and garlic  salt, seasoned salt, table salt, and sea salt. Canned and packaged gravies. Worcestershire sauce. Tartar sauce. Barbecue sauce. Teriyaki sauce. Soy sauce, including reduced sodium. Steak sauce. Fish sauce. Oyster sauce. Cocktail sauce. Horseradish. Ketchup and mustard. Meat flavorings and tenderizers. Bouillon cubes. Hot sauce. Tabasco sauce. Marinades. Taco seasonings. Relishes.  Fats and Oils   Butter, stick margarine, lard, shortening, ghee, and stout fat. Coconut, palm kernel, or palm oils. Regular salad dressings.  Other   Pickles and olives. Salted popcorn and pretzels.  The items listed above may not be a complete list of foods and beverages to avoid. Contact your dietitian for more information.   Where can I find more information?  [employer] [address]: www.nhlbi.nih.gov/health/health-topics/topics/dash/  This information is not intended to replace advice given to you by your health care provider. Make sure you discuss any questions you have with your health care provider.  Document Released: 12/06/2012 Document Revised: 05/25/2017 Document Reviewed: 10/22/2014  Elsevier Interactive Patient Education © 2017 Elsevier Inc.

## 2018-08-01 DIAGNOSIS — Z12.4 SCREENING FOR MALIGNANT NEOPLASM OF CERVIX: ICD-10-CM

## 2018-08-01 DIAGNOSIS — Z01.419 WELL WOMAN EXAM WITH ROUTINE GYNECOLOGICAL EXAM: ICD-10-CM

## 2018-08-01 DIAGNOSIS — Z11.51 SCREENING FOR HPV (HUMAN PAPILLOMAVIRUS): ICD-10-CM

## 2018-08-02 LAB
CYTOLOGY REG CYTOL: NORMAL
HPV HR 12 DNA CVX QL NAA+PROBE: NEGATIVE
HPV16 DNA SPEC QL NAA+PROBE: NEGATIVE
HPV18 DNA SPEC QL NAA+PROBE: NEGATIVE
SPECIMEN SOURCE: NORMAL

## 2019-01-31 ENCOUNTER — APPOINTMENT (OUTPATIENT)
Dept: MEDICAL GROUP | Facility: LAB | Age: 54
End: 2019-01-31
Payer: COMMERCIAL

## 2019-02-06 ENCOUNTER — OFFICE VISIT (OUTPATIENT)
Dept: URGENT CARE | Facility: PHYSICIAN GROUP | Age: 54
End: 2019-02-06
Payer: COMMERCIAL

## 2019-02-06 ENCOUNTER — APPOINTMENT (OUTPATIENT)
Dept: RADIOLOGY | Facility: IMAGING CENTER | Age: 54
End: 2019-02-06
Attending: PHYSICIAN ASSISTANT
Payer: COMMERCIAL

## 2019-02-06 VITALS
WEIGHT: 194 LBS | SYSTOLIC BLOOD PRESSURE: 122 MMHG | HEIGHT: 67 IN | BODY MASS INDEX: 30.45 KG/M2 | HEART RATE: 100 BPM | RESPIRATION RATE: 16 BRPM | TEMPERATURE: 98.8 F | DIASTOLIC BLOOD PRESSURE: 76 MMHG | OXYGEN SATURATION: 98 %

## 2019-02-06 DIAGNOSIS — S99.912A INJURY OF LEFT ANKLE, INITIAL ENCOUNTER: ICD-10-CM

## 2019-02-06 DIAGNOSIS — S93.492A SPRAIN OF ANTERIOR TALOFIBULAR LIGAMENT OF LEFT ANKLE, INITIAL ENCOUNTER: ICD-10-CM

## 2019-02-06 PROCEDURE — 99214 OFFICE O/P EST MOD 30 MIN: CPT | Performed by: PHYSICIAN ASSISTANT

## 2019-02-06 PROCEDURE — 73610 X-RAY EXAM OF ANKLE: CPT | Mod: TC,LT | Performed by: PHYSICIAN ASSISTANT

## 2019-02-06 RX ORDER — TRIAMCINOLONE ACETONIDE 55 UG/1
2 SPRAY, METERED NASAL DAILY
COMMUNITY

## 2019-02-06 ASSESSMENT — ENCOUNTER SYMPTOMS
RESPIRATORY NEGATIVE: 1
MUSCLE WEAKNESS: 0
NUMBNESS: 0
FALLS: 0
INABILITY TO BEAR WEIGHT: 0
TINGLING: 0
CARDIOVASCULAR NEGATIVE: 1
LOSS OF MOTION: 0
LOSS OF SENSATION: 0
CONSTITUTIONAL NEGATIVE: 1
GASTROINTESTINAL NEGATIVE: 1

## 2019-02-06 NOTE — PROGRESS NOTES
Subjective:      Jamarcus Winters is a 53 y.o. female who presents with Ankle Injury (Rolled left ankle x 1 hour ago.)            Ankle Injury    The incident occurred 1 to 3 hours ago. The incident occurred at home. The injury mechanism was an inversion injury. The pain is present in the left ankle. The pain is at a severity of 2/10. The pain is moderate. The pain has been constant since onset. Pertinent negatives include no inability to bear weight, loss of motion, loss of sensation, muscle weakness, numbness or tingling. She reports no foreign bodies present. The symptoms are aggravated by movement, palpation and weight bearing. She has tried nothing for the symptoms. The treatment provided no relief.       PMH:  has a past medical history of Elevated blood pressure reading (3/29/2018); Family history of alcoholism (9/23/2011); Foot pain (9/23/2011); History of depression (9/23/2011); Neutrophilia (3/29/2018); and Pain.  MEDS:   Current Outpatient Prescriptions:   •  triamcinolone (NASACORT ALLERGY 24HR) 55 MCG/ACT nasal inhaler, Spray 2 Sprays in nose every day., Disp: , Rfl:   •  cyclobenzaprine (FLEXERIL) 5 MG tablet, Take 1-2 Tabs by mouth 3 times a day as needed., Disp: 30 Tab, Rfl: 0  •  ibuprofen (MOTRIN) 400 MG Tab, Take 400 mg by mouth every 6 hours as needed., Disp: , Rfl:   •  fluticasone (FLONASE) 50 MCG/ACT nasal spray, Spray 1 Spray in nose every day., Disp: , Rfl:   ALLERGIES: No Known Allergies  SURGHX:   Past Surgical History:   Procedure Laterality Date   • JOHNNA BY LAPAROSCOPY  4/8/2011    Performed by MONIK LONDONO at [facility]   • OTHER  1981    BREAST REDUCTION   • APPENDECTOMY     • KNEE ARTHROSCOPY      menisectomy   • NERVE ULNAR TRANSFER     • OTHER ABDOMINAL SURGERY      appy 91   • OTHER ORTHOPEDIC SURGERY      r elbow surgery   • OTHER ORTHOPEDIC SURGERY      r knee   • OVARIAN CYSTECTOMY       SOCHX:  reports that she quit smoking about 24 years ago. She has a 10.00  "pack-year smoking history. She has never used smokeless tobacco. She reports that she drinks alcohol. She reports that she does not use drugs.  FH: She was adopted. Family history is unknown by patient.    Review of Systems   Constitutional: Negative.    Respiratory: Negative.    Cardiovascular: Negative.    Gastrointestinal: Negative.    Musculoskeletal: Positive for joint pain. Negative for falls.   Neurological: Negative for tingling and numbness.       Medications, Allergies, and current problem list reviewed today in Epic     Objective:     /76   Pulse 100   Temp 37.1 °C (98.8 °F)   Resp 16   Ht 1.702 m (5' 7\")   Wt 88 kg (194 lb)   SpO2 98%   BMI 30.38 kg/m²      Physical Exam   Constitutional: She is oriented to person, place, and time. She appears well-developed and well-nourished. No distress.   HENT:   Head: Normocephalic and atraumatic.   Eyes: Conjunctivae and EOM are normal.   Neck: Normal range of motion. Neck supple.   Cardiovascular: Normal rate, regular rhythm and normal heart sounds.    Pulmonary/Chest: Effort normal and breath sounds normal. No respiratory distress. She has no wheezes.   Musculoskeletal:        Left ankle: She exhibits decreased range of motion and swelling. She exhibits no deformity and normal pulse. Tenderness. AITFL tenderness found. No head of 5th metatarsal and no proximal fibula tenderness found. Achilles tendon normal.        Feet:    Neurological: She is alert and oriented to person, place, and time.   Skin: Skin is warm and dry. She is not diaphoretic.   Psychiatric: She has a normal mood and affect. Her behavior is normal. Judgment and thought content normal.   Nursing note and vitals reviewed.              Assessment/Plan:     1. Injury of left ankle, initial encounter  DX-ANKLE 3+ VIEWS LEFT   2. Sprain of anterior talofibular ligament of left ankle, initial encounter       Xray: no fracture or dislocation by my read. Radiology review pending.    Aircast " given  OTC meds and conservative measures as discussed  Return to clinic or go to ED if symptoms worsen or persist. Indications for ED discussed at length. Patient voices understanding. Follow-up with your primary care provider in 3-5 days. Red flags discussed. All side effects of medication discussed including allergic response, GI upset, tendon injury, etc.    Please note that this dictation was created using voice recognition software. I have made every reasonable attempt to correct obvious errors, but I expect that there are errors of grammar and possibly content that I did not discover before finalizing the note.

## 2019-03-12 ENCOUNTER — OFFICE VISIT (OUTPATIENT)
Dept: MEDICAL GROUP | Facility: LAB | Age: 54
End: 2019-03-12
Payer: COMMERCIAL

## 2019-03-12 VITALS
BODY MASS INDEX: 32.21 KG/M2 | HEART RATE: 100 BPM | WEIGHT: 205.2 LBS | SYSTOLIC BLOOD PRESSURE: 122 MMHG | OXYGEN SATURATION: 96 % | TEMPERATURE: 99.2 F | HEIGHT: 67 IN | RESPIRATION RATE: 14 BRPM | DIASTOLIC BLOOD PRESSURE: 72 MMHG

## 2019-03-12 DIAGNOSIS — R63.5 WEIGHT GAIN: ICD-10-CM

## 2019-03-12 DIAGNOSIS — M25.561 CHRONIC PAIN OF RIGHT KNEE: ICD-10-CM

## 2019-03-12 DIAGNOSIS — E55.9 VITAMIN D DEFICIENCY: ICD-10-CM

## 2019-03-12 DIAGNOSIS — G89.29 CHRONIC PAIN OF RIGHT KNEE: ICD-10-CM

## 2019-03-12 DIAGNOSIS — M54.12 CERVICAL RADICULOPATHY: ICD-10-CM

## 2019-03-12 DIAGNOSIS — Z00.00 HEALTH MAINTENANCE EXAMINATION: ICD-10-CM

## 2019-03-12 DIAGNOSIS — M71.21 BAKER'S CYST OF KNEE, RIGHT: ICD-10-CM

## 2019-03-12 DIAGNOSIS — E04.1 THYROID NODULE: ICD-10-CM

## 2019-03-12 DIAGNOSIS — M54.16 LUMBAR RADICULOPATHY: ICD-10-CM

## 2019-03-12 DIAGNOSIS — E78.2 MIXED HYPERLIPIDEMIA: ICD-10-CM

## 2019-03-12 PROCEDURE — 99214 OFFICE O/P EST MOD 30 MIN: CPT | Performed by: FAMILY MEDICINE

## 2019-03-12 RX ORDER — ALBUTEROL SULFATE 90 UG/1
2 AEROSOL, METERED RESPIRATORY (INHALATION) EVERY 6 HOURS PRN
COMMUNITY

## 2019-03-12 ASSESSMENT — PATIENT HEALTH QUESTIONNAIRE - PHQ9: CLINICAL INTERPRETATION OF PHQ2 SCORE: 0

## 2019-03-21 ENCOUNTER — OFFICE VISIT (OUTPATIENT)
Dept: URGENT CARE | Facility: PHYSICIAN GROUP | Age: 54
End: 2019-03-21
Payer: COMMERCIAL

## 2019-03-21 VITALS
HEIGHT: 67 IN | RESPIRATION RATE: 15 BRPM | BODY MASS INDEX: 32.18 KG/M2 | OXYGEN SATURATION: 94 % | WEIGHT: 205 LBS | TEMPERATURE: 99.4 F | DIASTOLIC BLOOD PRESSURE: 78 MMHG | SYSTOLIC BLOOD PRESSURE: 122 MMHG | HEART RATE: 78 BPM

## 2019-03-21 DIAGNOSIS — J01.00 ACUTE NON-RECURRENT MAXILLARY SINUSITIS: ICD-10-CM

## 2019-03-21 PROCEDURE — 99214 OFFICE O/P EST MOD 30 MIN: CPT | Performed by: PHYSICIAN ASSISTANT

## 2019-03-21 RX ORDER — AMOXICILLIN AND CLAVULANATE POTASSIUM 875; 125 MG/1; MG/1
1 TABLET, FILM COATED ORAL 2 TIMES DAILY
Qty: 20 TAB | Refills: 0 | Status: SHIPPED | OUTPATIENT
Start: 2019-03-21 | End: 2019-03-31

## 2019-03-21 ASSESSMENT — ENCOUNTER SYMPTOMS
HEADACHES: 1
COUGH: 1
EYE DISCHARGE: 1
SINUS PAIN: 1
SINUS PRESSURE: 1
ABDOMINAL PAIN: 0
SORE THROAT: 0
VOMITING: 0
SHORTNESS OF BREATH: 0
CHILLS: 0
MYALGIAS: 1
NAUSEA: 0
FEVER: 1

## 2019-03-21 NOTE — PROGRESS NOTES
Subjective:      Jamarcus Winters is a 53 y.o. female who presents with Sinusitis (x 3 weeks  needs note for work to not fly )            Sinusitis   This is a new problem. Episode onset: 3 weeks ago. The problem is unchanged. The maximum temperature recorded prior to her arrival was 101 - 101.9 F. The pain is mild. Associated symptoms include congestion, coughing, ear pain, headaches and sinus pressure. Pertinent negatives include no chills, shortness of breath or sore throat. Treatments tried: Mucinex, Sudafed. The treatment provided mild relief.     Patient is also requesting a note for work. 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     PMH:  has a past medical history of Elevated blood pressure reading (3/29/2018); Family history of alcoholism (9/23/2011); Foot pain (9/23/2011); History of depression (9/23/2011); Neutrophilia (3/29/2018); and Pain.  MEDS:   Current Outpatient Prescriptions:   •  albuterol 108 (90 Base) MCG/ACT Aero Soln inhalation aerosol, Inhale 2 Puffs by mouth every 6 hours as needed for Shortness of Breath., Disp: , Rfl:   •  triamcinolone (NASACORT ALLERGY 24HR) 55 MCG/ACT nasal inhaler, Spray 2 Sprays in nose every day., Disp: , Rfl:   •  ibuprofen (MOTRIN) 400 MG Tab, Take 400 mg by mouth every 6 hours as needed., Disp: , Rfl:   ALLERGIES: No Known Allergies  SURGHX:   Past Surgical History:   Procedure Laterality Date   • JOHNNA BY LAPAROSCOPY  4/8/2011    Performed by MONIK LONDONO at [facility] ORS   • OTHER  1981    BREAST REDUCTION   • APPENDECTOMY     • KNEE ARTHROSCOPY      menisectomy   • NERVE ULNAR TRANSFER     • OTHER ABDOMINAL SURGERY      appy 91   • OTHER ORTHOPEDIC SURGERY      r elbow surgery   • OTHER ORTHOPEDIC SURGERY      r knee   • OVARIAN CYSTECTOMY       SOCHX:  reports that she quit smoking about 24 years ago. She has a 10.00 pack-year smoking history. She has never used smokeless tobacco. She  "reports that she drinks alcohol. She reports that she does not use drugs.  FH: Family history was reviewed, no pertinent findings to report    Review of Systems   Constitutional: Positive for fever. Negative for chills.   HENT: Positive for congestion, ear pain, sinus pain and sinus pressure. Negative for sore throat.    Eyes: Positive for discharge.   Respiratory: Positive for cough. Negative for shortness of breath.    Cardiovascular: Negative for chest pain and leg swelling.   Gastrointestinal: Negative for abdominal pain, nausea and vomiting.   Genitourinary: Negative for dysuria, frequency and urgency.   Musculoskeletal: Positive for myalgias. Negative for joint pain.   Skin: Negative for rash.   Neurological: Positive for headaches.          Objective:     /78   Pulse 78   Temp 37.4 °C (99.4 °F) (Temporal)   Resp 15   Ht 1.702 m (5' 7\")   Wt 93 kg (205 lb)   SpO2 94%   BMI 32.11 kg/m²      Physical Exam   Constitutional: She is oriented to person, place, and time. She appears well-developed and well-nourished. No distress.   HENT:   Head: Normocephalic and atraumatic.   Right Ear: Tympanic membrane, external ear and ear canal normal.   Left Ear: Tympanic membrane, external ear and ear canal normal.   Nose: Right sinus exhibits maxillary sinus tenderness. Left sinus exhibits maxillary sinus tenderness.   Mouth/Throat: Oropharynx is clear and moist. No posterior oropharyngeal erythema. Tonsils are 0 on the right. Tonsils are 0 on the left.   Eyes: Conjunctivae and EOM are normal. Right eye exhibits no discharge. Left eye exhibits no discharge.   Neck: Normal range of motion. Neck supple.   Cardiovascular: Normal rate, regular rhythm and normal heart sounds.    Pulmonary/Chest: Effort normal and breath sounds normal.   Musculoskeletal: Normal range of motion. She exhibits no edema.   Neurological: She is alert and oriented to person, place, and time.   Skin: Skin is warm and dry.             "   Assessment/Plan:     1. Acute Non-Recurrent Maxillary Sinusitis  The patient's presenting symptoms and physical exam are consistent with acute maxillary sinusitis.  Given the duration of her symptoms she would likely benefit from antibiotic treatment at this time.  Will prescribe Augmentin.  Discussed strict return precautions with the patient and she verbalized understanding.  Plan:  Augmentin 875-125mg PO BID x 10 days  OTC Tylenol or Motrin for fever/discomfort  OTC Supportive Care for Congestion - saline nasal spray or neti pot  Drink plenty of fluids  Vitamin C for immune health  Work note provided  Return to clinic or go to the ED if symptoms worsen or fail to improve, or if patient develops severe fever, worsening/increasing sinus pain/pressure, worsening/increasing shortness of breath, worsening cough with sputum production, and/or chest pain.     Discussed plan with the patient, and she agrees to the above.

## 2019-03-21 NOTE — LETTER
[facility]  [address]  [place] [address]     March 21, 2019    Patient: Jamarcus Winters   YOB: 1965   Date of Visit: 3/21/2019       To Whom It May Concern:    Jamarcus Winters was seen and treated in our department on 3/21/2019. Please excuse her from flying tomorrow (3/22).       Sincerely,         Mariah Dickens

## 2019-04-23 ENCOUNTER — HOSPITAL ENCOUNTER (OUTPATIENT)
Dept: RADIOLOGY | Facility: MEDICAL CENTER | Age: 54
End: 2019-04-23
Attending: SPECIALIST
Payer: COMMERCIAL

## 2019-04-23 DIAGNOSIS — J31.0 CHRONIC RHINITIS: ICD-10-CM

## 2019-04-23 DIAGNOSIS — J34.3 HYPERTROPHY OF NASAL TURBINATES: ICD-10-CM

## 2019-04-23 PROCEDURE — 70486 CT MAXILLOFACIAL W/O DYE: CPT

## 2019-04-24 ENCOUNTER — APPOINTMENT (OUTPATIENT)
Dept: RADIOLOGY | Facility: MEDICAL CENTER | Age: 54
End: 2019-04-24
Attending: FAMILY MEDICINE
Payer: COMMERCIAL

## 2019-05-10 ENCOUNTER — HOSPITAL ENCOUNTER (OUTPATIENT)
Dept: LAB | Facility: MEDICAL CENTER | Age: 54
End: 2019-05-10
Attending: FAMILY MEDICINE
Payer: COMMERCIAL

## 2019-05-10 DIAGNOSIS — E55.9 VITAMIN D DEFICIENCY: ICD-10-CM

## 2019-05-10 DIAGNOSIS — Z00.00 HEALTH MAINTENANCE EXAMINATION: ICD-10-CM

## 2019-05-10 DIAGNOSIS — E04.1 THYROID NODULE: ICD-10-CM

## 2019-05-10 DIAGNOSIS — E78.2 MIXED HYPERLIPIDEMIA: ICD-10-CM

## 2019-05-10 LAB
25(OH)D3 SERPL-MCNC: 22 NG/ML (ref 30–100)
ALBUMIN SERPL BCP-MCNC: 4.6 G/DL (ref 3.2–4.9)
ALBUMIN/GLOB SERPL: 1.6 G/DL
ALP SERPL-CCNC: 73 U/L (ref 30–99)
ALT SERPL-CCNC: 22 U/L (ref 2–50)
ANION GAP SERPL CALC-SCNC: 10 MMOL/L (ref 0–11.9)
AST SERPL-CCNC: 23 U/L (ref 12–45)
BASOPHILS # BLD AUTO: 0.6 % (ref 0–1.8)
BASOPHILS # BLD: 0.05 K/UL (ref 0–0.12)
BILIRUB SERPL-MCNC: 0.6 MG/DL (ref 0.1–1.5)
BUN SERPL-MCNC: 19 MG/DL (ref 8–22)
CALCIUM SERPL-MCNC: 9.9 MG/DL (ref 8.5–10.5)
CHLORIDE SERPL-SCNC: 103 MMOL/L (ref 96–112)
CHOLEST SERPL-MCNC: 288 MG/DL (ref 100–199)
CO2 SERPL-SCNC: 27 MMOL/L (ref 20–33)
CREAT SERPL-MCNC: 0.99 MG/DL (ref 0.5–1.4)
EOSINOPHIL # BLD AUTO: 0.1 K/UL (ref 0–0.51)
EOSINOPHIL NFR BLD: 1.2 % (ref 0–6.9)
ERYTHROCYTE [DISTWIDTH] IN BLOOD BY AUTOMATED COUNT: 45.2 FL (ref 35.9–50)
FASTING STATUS PATIENT QL REPORTED: NORMAL
GLOBULIN SER CALC-MCNC: 2.9 G/DL (ref 1.9–3.5)
GLUCOSE SERPL-MCNC: 102 MG/DL (ref 65–99)
HCT VFR BLD AUTO: 45.9 % (ref 37–47)
HDLC SERPL-MCNC: 87 MG/DL
HGB BLD-MCNC: 15.2 G/DL (ref 12–16)
IMM GRANULOCYTES # BLD AUTO: 0.04 K/UL (ref 0–0.11)
IMM GRANULOCYTES NFR BLD AUTO: 0.5 % (ref 0–0.9)
LDLC SERPL CALC-MCNC: 181 MG/DL
LYMPHOCYTES # BLD AUTO: 2.57 K/UL (ref 1–4.8)
LYMPHOCYTES NFR BLD: 29.6 % (ref 22–41)
MCH RBC QN AUTO: 32.6 PG (ref 27–33)
MCHC RBC AUTO-ENTMCNC: 33.1 G/DL (ref 33.6–35)
MCV RBC AUTO: 98.5 FL (ref 81.4–97.8)
MONOCYTES # BLD AUTO: 0.74 K/UL (ref 0–0.85)
MONOCYTES NFR BLD AUTO: 8.5 % (ref 0–13.4)
NEUTROPHILS # BLD AUTO: 5.17 K/UL (ref 2–7.15)
NEUTROPHILS NFR BLD: 59.6 % (ref 44–72)
NRBC # BLD AUTO: 0 K/UL
NRBC BLD-RTO: 0 /100 WBC
PLATELET # BLD AUTO: 381 K/UL (ref 164–446)
PMV BLD AUTO: 10.1 FL (ref 9–12.9)
POTASSIUM SERPL-SCNC: 4.4 MMOL/L (ref 3.6–5.5)
PROT SERPL-MCNC: 7.5 G/DL (ref 6–8.2)
RBC # BLD AUTO: 4.66 M/UL (ref 4.2–5.4)
SODIUM SERPL-SCNC: 140 MMOL/L (ref 135–145)
T4 FREE SERPL-MCNC: 0.77 NG/DL (ref 0.53–1.43)
TRIGL SERPL-MCNC: 102 MG/DL (ref 0–149)
TSH SERPL DL<=0.005 MIU/L-ACNC: 2.96 UIU/ML (ref 0.38–5.33)
WBC # BLD AUTO: 8.7 K/UL (ref 4.8–10.8)

## 2019-05-10 PROCEDURE — 85025 COMPLETE CBC W/AUTO DIFF WBC: CPT

## 2019-05-10 PROCEDURE — 80061 LIPID PANEL: CPT

## 2019-05-10 PROCEDURE — 84443 ASSAY THYROID STIM HORMONE: CPT

## 2019-05-10 PROCEDURE — 82306 VITAMIN D 25 HYDROXY: CPT

## 2019-05-10 PROCEDURE — 36415 COLL VENOUS BLD VENIPUNCTURE: CPT

## 2019-05-10 PROCEDURE — 84439 ASSAY OF FREE THYROXINE: CPT

## 2019-05-10 PROCEDURE — 80053 COMPREHEN METABOLIC PANEL: CPT

## 2019-07-10 ENCOUNTER — HOSPITAL ENCOUNTER (OUTPATIENT)
Dept: RADIOLOGY | Facility: MEDICAL CENTER | Age: 54
End: 2019-07-10
Attending: FAMILY MEDICINE
Payer: COMMERCIAL

## 2019-07-10 DIAGNOSIS — E04.1 THYROID NODULE: ICD-10-CM

## 2019-07-10 PROCEDURE — 76536 US EXAM OF HEAD AND NECK: CPT

## 2019-12-09 ENCOUNTER — OCCUPATIONAL MEDICINE (OUTPATIENT)
Dept: URGENT CARE | Facility: CLINIC | Age: 54
End: 2019-12-09
Payer: COMMERCIAL

## 2019-12-09 ENCOUNTER — APPOINTMENT (OUTPATIENT)
Dept: OCCUPATIONAL MEDICINE | Facility: CLINIC | Age: 54
End: 2019-12-09
Payer: COMMERCIAL

## 2019-12-09 ENCOUNTER — APPOINTMENT (OUTPATIENT)
Dept: RADIOLOGY | Facility: IMAGING CENTER | Age: 54
End: 2019-12-09
Attending: NURSE PRACTITIONER
Payer: COMMERCIAL

## 2019-12-09 VITALS
DIASTOLIC BLOOD PRESSURE: 92 MMHG | RESPIRATION RATE: 18 BRPM | HEIGHT: 67 IN | OXYGEN SATURATION: 97 % | HEART RATE: 110 BPM | TEMPERATURE: 98.7 F | SYSTOLIC BLOOD PRESSURE: 134 MMHG | BODY MASS INDEX: 32.8 KG/M2 | WEIGHT: 209 LBS

## 2019-12-09 DIAGNOSIS — S62.663D OPEN NONDISPLACED FRACTURE OF DISTAL PHALANX OF LEFT MIDDLE FINGER WITH ROUTINE HEALING, SUBSEQUENT ENCOUNTER: ICD-10-CM

## 2019-12-09 DIAGNOSIS — S61.313D LACERATION OF LEFT MIDDLE FINGER WITHOUT FOREIGN BODY WITH DAMAGE TO NAIL, SUBSEQUENT ENCOUNTER: ICD-10-CM

## 2019-12-09 DIAGNOSIS — S62.633B OPEN DISPLACED FRACTURE OF DISTAL PHALANX OF LEFT MIDDLE FINGER, INITIAL ENCOUNTER: ICD-10-CM

## 2019-12-09 PROCEDURE — 99214 OFFICE O/P EST MOD 30 MIN: CPT | Mod: 29 | Performed by: NURSE PRACTITIONER

## 2019-12-09 PROCEDURE — 73140 X-RAY EXAM OF FINGER(S): CPT | Mod: TC,LT,29 | Performed by: NURSE PRACTITIONER

## 2019-12-09 ASSESSMENT — ENCOUNTER SYMPTOMS
FEVER: 0
WHEEZING: 0
CHILLS: 0
SENSORY CHANGE: 1
SHORTNESS OF BREATH: 0

## 2019-12-09 NOTE — PROGRESS NOTES
"Subjective:   Jamarcus Winters is a 54 y.o. female who presents for Hand Injury ([facility] DOI-12/6/19 (L) feels better)    DOI 12/6/2019: patient states when moving gurney in the back of the ambulance it crushed her left middle finger between the gurney and the mount. States she went to pull finger out. She was initially seen by Silvio HOYT where she was diagnosed with tuft fracture of the left middle finger and x3 sutures were placed. States with moderate pain and sensation changes. Has not been seen by Ortho. Denies second employer. Was started on Keflex   HPI     Review of Systems   Constitutional: Negative for chills and fever.   Respiratory: Negative for shortness of breath and wheezing.    Cardiovascular: Negative for chest pain.   Musculoskeletal: Positive for joint pain (Left middle finger).   Skin:        Laceration and lifted nailbed   Neurological: Positive for sensory change.     Patient's PMH, SocHx, SurgHx, FamHx, Drug allergies and medications reviewed.     Objective:   /92 (BP Location: Left arm)   Pulse (!) 110   Temp 37.1 °C (98.7 °F) (Temporal)   Resp 18   Ht 1.702 m (5' 7\")   Wt 94.8 kg (209 lb)   SpO2 97%   BMI 32.73 kg/m²   Physical Exam  Constitutional:       General: She is not in acute distress.     Appearance: She is well-developed. She is not diaphoretic.   HENT:      Head: Normocephalic.      Right Ear: Hearing normal.      Left Ear: Hearing normal.      Nose: Nose normal.   Eyes:      General: Lids are normal.      Conjunctiva/sclera: Conjunctivae normal.      Pupils: Pupils are equal, round, and reactive to light.   Neck:      Musculoskeletal: Normal range of motion.   Cardiovascular:      Rate and Rhythm: Normal rate and regular rhythm.      Heart sounds: Normal heart sounds.   Pulmonary:      Effort: Pulmonary effort is normal. No respiratory distress.      Breath sounds: Normal breath sounds. No decreased breath sounds or wheezing.   Musculoskeletal:        " Hands:    Skin:     General: Skin is warm.      Findings: No rash.   Neurological:      Mental Status: She is alert.   Psychiatric:         Mood and Affect: Mood normal.         Speech: Speech normal.         Behavior: Behavior normal.         Thought Content: Thought content normal.         Judgment: Judgment normal.       Left middle finger: Decreased ROM and nailbed lifted with opening. x3 sutures in place to lateral nailbed and mild bleeding. Decreased sensation and swelling to the area.   Assessment/Plan:   Assessment    1. Laceration of left middle finger without foreign body with damage to nail, subsequent encounter  - DX-FINGER(S) 2+ LEFT; Future  - REFERRAL TO HAND SURGERY  - REFERRAL TO OCCUPATIONAL MEDICINE    2. Open displaced fracture of distal phalanx of left middle finger, initial encounter    Spoke to Dr. Pantoja and recommends repeat xray since patient did not [place] xrays to [place] today [place]. Recommends STAT referral to Hand Surgery.      Differential diagnosis, natural history, supportive care, and indications for immediate follow-up discussed.     **Please note that all invasive procedures during this visit were performed by myself and/or the Medical Assistant under the supervision of the PA or MD in office**

## 2019-12-09 NOTE — LETTER
"EMPLOYEE’S CLAIM FOR COMPENSATION/ REPORT OF INITIAL TREATMENT  FORM C-4    EMPLOYEE’S CLAIM - PROVIDE ALL INFORMATION REQUESTED   First Name  Jamarcus Last Name  Vianey Birthdate                    1965                Sex  female Claim Number   Home Address  [address] Age  54 y.o. 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 (if applicable)      How did this injury or occupational disease occur? (Be specific an answer in detail. Use additional sheet if necessary)  While repositioning gurney in ambulance it fell and crushed L middle finger between gurney and mount   If you believe that you have an occupational disease, when did you first have knowledge of the disability and it relationship to your employment?  n/a Witnesses to the Accident        Nature of Injury or Occupational Disease  Crushing  Part(s) of Body Injured or Affected  Finger (L), Hand (L),     I certify that the above is true and correct to the best of my knowledge and that I have provided this information in order to obtain the benefits of [place] and " [place]  I hereby authorize any physician, chiropractor, surgeon, practitioner, or other person, any hospital, including [facility] or [facility], any medical service organization, any insurance company, or other institution or organization to release to each other, any medical or other information, including benefits paid or payable, pertinent to this injury or disease, except information relative to diagnosis, treatment and/or counseling for AIDS, psychological conditions, alcohol or controlled substances, for which I must give specific authorization.  A Photostat of this authorization shall be as valid as the original.     Date   Place   Employee’s Signature   THIS REPORT MUST BE COMPLETED AND MAILED WITHIN 3 WORKING DAYS OF TREATMENT   Place  [facility]  Name of Facility  [facility]   Date  12/9/2019 Diagnosis  (S61.313D) Laceration of left middle finger without foreign body with damage to nail, subsequent encounter  (S62.633B) Open displaced fracture of distal phalanx of left middle finger, initial encounter Is there evidence the injured employee was under the influence of alcohol and/or another controlled substance at the time of accident?   Hour  10:32 AM Description of Injury or Disease  Diagnoses of Laceration of left middle finger without foreign body with damage to nail, subsequent encounter and Open displaced fracture of distal phalanx of left middle finger, initial encounter were pertinent to this visit.     Treatment  No use of left hand and must keep covered. Referral to Hand Surgery - urgent.  Have you advised the patient to remain off work five days or more? No   X-Ray Findings  Positive   If Yes   From Date  To Date      From information given by the employee, together with medical evidence, can you directly connect this injury or occupational disease as job incurred?  Yes If No Full Duty No  "Modified Duty  Yes   Is additional medical care by a physician indicated?  Yes If Modified Duty, Specify any Limitations / Restrictions      Do you know of any previous injury or disease contributing to this condition or occupational disease?                            No   Date  12/9/2019 Print Doctor’s Name DAVID Berg I certify the employer’s copy of  this form was mailed on:   Address  [address] [place] [address] Insurer’s Use Only     [facility]  [address]    Provider’s Tax ID Number  [id number] Telephone  Dept: [phone]        lluvia-ITMOTHY Agustin   e-Signature: Dr. Ben Longoria,   Medical Director Degree  MD        ORIGINAL-TREATING PHYSICIAN OR CHIROPRACTOR    PAGE 2-INSURER/TPA    PAGE 3-EMPLOYER    PAGE 4-EMPLOYEE             Form C-4 (rev.10/07)              BRIEF DESCRIPTION OF RIGHTS AND BENEFITS  (Pursuant to [place] 616C.050)    Notice of Injury or Occupational Disease (Incident Report Form C-1): If an injury or occupational disease (OD) arises out of and in the course of employment, you must provide written notice to your employer as soon as practicable, but no later than 7 days after the accident or OD. Your employer shall maintain a sufficient supply of the required forms.    Claim for Compensation (Form C-4): If medical treatment is sought, the form C-4 is available at the place of initial treatment. A completed \"Claim for Compensation\" (Form C-4) must be filed within 90 days after an accident or OD. The treating physician or chiropractor must, within 3 working days after treatment, complete and mail to the employer, the employer's insurer and third-party , the Claim for Compensation.    Medical Treatment: If you require medical treatment for your on-the-job injury or OD, you may be required to select a physician or chiropractor from a list provided by your workers’ compensation insurer, if it has contracted with an Organization for Managed Care (MCO) or " Preferred Provider Organization (PPO) or providers of health care. If your employer has not entered into a contract with an MCO or PPO, you may select a physician or chiropractor from the Panel of Physicians and Chiropractors. Any medical costs related to your industrial injury or OD will be paid by your insurer.    Temporary Total Disability (TTD): If your doctor has certified that you are unable to work for a period of at least 5 consecutive days, or 5 cumulative days in a 20-day period, or places restrictions on you that your employer does not accommodate, you may be entitled to TTD compensation.    Temporary Partial Disability (TPD): If the wage you receive upon reemployment is less than the compensation for TTD to which you are entitled, the insurer may be required to pay you TPD compensation to make up the difference. TPD can only be paid for a maximum of 24 months.    Permanent Partial Disability (PPD): When your medical condition is stable and there is an indication of a PPD as a result of your injury or OD, within 30 days, your insurer must arrange for an evaluation by a rating physician or chiropractor to determine the degree of your PPD. The amount of your PPD award depends on the date of injury, the results of the PPD evaluation and your age and wage.    Permanent Total Disability (PTD): If you are medically certified by a treating physician or chiropractor as permanently and totally disabled and have been granted a PTD status by your insurer, you are entitled to receive monthly benefits not to exceed 66 2/3% of your average monthly wage. The amount of your PTD payments is subject to reduction if you previously received a PPD award.    Vocational Rehabilitation Services: You may be eligible for vocational rehabilitation services if you are unable to return to the job due to a permanent physical impairment or permanent restrictions as a result of your injury or occupational disease.    Transportation and  Per Billy Reimbursement: You may be eligible for travel expenses and per billy associated with medical treatment.    Reopening: You may be able to reopen your claim if your condition worsens after claim closure.    Appeal Process: If you disagree with a written determination issued by the insurer or the insurer does not respond to your request, you may appeal to the Department of Administration, , by following the instructions contained in your determination letter. You must appeal the determination within 70 days from the date of the determination letter at [address], [address], [place], [place] [address], or [address], [address], [place], [place] [address]. If you disagree with the  decision, you may appeal to the Department of Administration, . You must file your appeal within 30 days from the date of the  decision letter at [address], [address], [place], [place] [address], or [address], [place], [place] [address]. If you disagree with a decision of an , you may file a petition for judicial review with the District Court. You must do so within 30 days of the Appeal Officer’s decision. You may be represented by an  at your own expense or you may contact the [facility] for possible representation.    [place]  for Injured Workers (NAIW): If you disagree with a  decision, you may request that NAIW represent you without charge at an  Hearing. For information regarding denial of benefits, you may contact the [facility] at: [address], [address], [place] [address], [phone], or [address], [place] [address], [phone]    To File a Complaint with the Division: If you wish to file a complaint with the  of the Division of Industrial Relations (DIR),  please contact the Workers’ Compensation Section, [address], [address], [place], [place] [address], telephone ([phone]) [phone], or [address], [address], [place], [place] [address], telephone [phone].    For assistance with Workers’ Compensation Issues: You may contact the Office of the [facility] Consumer Health Assistance, [address] [place] [address], [address], [place], [place] [address], Toll Free 1-815.972.2732, Web site: http://[employer].[facility].nv., E-mail [email or web address].                   __________________________________________________________________                                                     _________        Employee Name / Signature                                                                                                                                              Date                                                                                                                                                                                                     D-2 (rev. 06/18)

## 2019-12-09 NOTE — LETTER
[facility] [address] [place] [address]  Phone:  [phone] - Fax:  [phone]   [facility] Progress Report and Disability Certification  Date of Service: 12/9/2019   No Show:  No  Date / Time of Next Visit: 12/16/2019 @ 2:30 pm  Dr Pantoja   Claim Information   Patient Name: Jamarcus Winters  Claim Number:     Employer: [employer]  Date of Injury: 12/6/2019     Insurer / TPA: [employer]  ID / SSN:     Occupation: Paramedic  Diagnosis: Diagnoses of Laceration of left middle finger without foreign body with damage to nail, subsequent encounter and Open displaced fracture of distal phalanx of left middle finger, initial encounter were pertinent to this visit.    Medical Information   Related to Industrial Injury? Yes    Subjective Complaints:  DOI 12/6/2019: patient states when moving gurney in the back of the ambulance it crushed her left middle finger between the gurney and the mount. States she went to pull finger out. She was initially seen by Silvio HOYT where she was diagnosed with tuft fracture of the left middle finger and x3 sutures were placed. States with moderate pain and sensation changes. Has not been seen by Ortho. Denies second employer. Was started on Keflex   Objective Findings: Left middle finger: Decreased ROM and nailbed lifted with opening. x3 sutures in place to lateral nailbed and mild bleeding. Decreased sensation and swelling to the area.   Pre-Existing Condition(s): None   Assessment:   Initial Visit    Status: Additional Care Required  Permanent Disability:No    Plan:      Diagnostics: X-ray    Comments:  Xray with severely comminuted tuft fracture of the distal phalanx of the left middle finger    Disability Information   Status: Released to Restricted Duty    From:  12/9/2019  Through: 12/16/2019 Restrictions are: Temporary   Physical Restrictions   Sitting:    Standing:    Stooping:    Bending:       Squatting:    Walking:    Climbing:    Pushing:      Pulling:    Other:    Reaching Above Shoulder (L):   Reaching Above Shoulder (R):       Reaching Below Shoulder (L):    Reaching Below Shoulder (R):      Not to exceed Weight Limits   Carrying(hrs):   Weight Limit(lb):   Comments:no lifting left hand Lifting(hrs):   Weight  Limit(lb):     Comments: No use of left hand and must keep covered. Referral to Hand Surgery - urgent.    Repetitive Actions   Hands: i.e. Fine Manipulations from Grasping:     Feet: i.e. Operating Foot Controls:     Driving / Operate Machinery:     Physician Name: DAVID Berg Physician Signature: TIMOTHY Hernandez e-Signature: Dr. Ben Longoria, Medical Director   Clinic Name / Location: [address] [address] Clinic Phone Number: Dept: [phone]   Appointment Time: 10:30 Am Visit Start Time: 10:32 AM   Check-In Time:  9:38 Am Visit Discharge Time:  11:59 AM   Original-Treating Physician or Chiropractor    Page 2-Insurer/TPA    Page 3-Employer    Page 4-Employee